# Patient Record
Sex: FEMALE | Race: WHITE | NOT HISPANIC OR LATINO | Employment: OTHER | URBAN - METROPOLITAN AREA
[De-identification: names, ages, dates, MRNs, and addresses within clinical notes are randomized per-mention and may not be internally consistent; named-entity substitution may affect disease eponyms.]

---

## 2020-01-01 ENCOUNTER — APPOINTMENT (OUTPATIENT)
Dept: RADIOLOGY | Facility: MEDICAL CENTER | Age: 85
DRG: 177 | End: 2020-01-01
Attending: EMERGENCY MEDICINE
Payer: MEDICARE

## 2020-01-01 ENCOUNTER — HOSPITAL ENCOUNTER (INPATIENT)
Facility: MEDICAL CENTER | Age: 85
LOS: 7 days | DRG: 177 | End: 2021-01-01
Attending: EMERGENCY MEDICINE | Admitting: INTERNAL MEDICINE
Payer: MEDICARE

## 2020-01-01 ENCOUNTER — APPOINTMENT (OUTPATIENT)
Dept: RADIOLOGY | Facility: MEDICAL CENTER | Age: 85
DRG: 177 | End: 2020-01-01
Attending: FAMILY MEDICINE
Payer: MEDICARE

## 2020-01-01 ENCOUNTER — APPOINTMENT (OUTPATIENT)
Dept: CARDIOLOGY | Facility: MEDICAL CENTER | Age: 85
DRG: 177 | End: 2020-01-01
Attending: INTERNAL MEDICINE
Payer: MEDICARE

## 2020-01-01 DIAGNOSIS — F03.90 DEMENTIA WITHOUT BEHAVIORAL DISTURBANCE, UNSPECIFIED DEMENTIA TYPE: ICD-10-CM

## 2020-01-01 DIAGNOSIS — J12.82 PNEUMONIA DUE TO COVID-19 VIRUS: ICD-10-CM
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
## 2020-01-01 DIAGNOSIS — R09.02 HYPOXIA: ICD-10-CM

## 2020-01-01 DIAGNOSIS — U07.1 PNEUMONIA DUE TO COVID-19 VIRUS: ICD-10-CM

## 2020-01-01 LAB
ALBUMIN SERPL BCP-MCNC: 3.3 G/DL (ref 3.2–4.9)
ALBUMIN SERPL BCP-MCNC: 3.4 G/DL (ref 3.2–4.9)
ALBUMIN SERPL BCP-MCNC: 3.5 G/DL (ref 3.2–4.9)
ALBUMIN SERPL BCP-MCNC: 3.8 G/DL (ref 3.2–4.9)
ALBUMIN/GLOB SERPL: 1.1 G/DL
ALBUMIN/GLOB SERPL: 1.2 G/DL
ALBUMIN/GLOB SERPL: 1.4 G/DL
ALBUMIN/GLOB SERPL: 1.5 G/DL
ALP SERPL-CCNC: 66 U/L (ref 30–99)
ALP SERPL-CCNC: 71 U/L (ref 30–99)
ALT SERPL-CCNC: 28 U/L (ref 2–50)
ALT SERPL-CCNC: 49 U/L (ref 2–50)
ALT SERPL-CCNC: 62 U/L (ref 2–50)
ALT SERPL-CCNC: 65 U/L (ref 2–50)
ANION GAP SERPL CALC-SCNC: 10 MMOL/L (ref 7–16)
ANION GAP SERPL CALC-SCNC: 10 MMOL/L (ref 7–16)
ANION GAP SERPL CALC-SCNC: 11 MMOL/L (ref 7–16)
ANION GAP SERPL CALC-SCNC: 12 MMOL/L (ref 7–16)
ANION GAP SERPL CALC-SCNC: 13 MMOL/L (ref 7–16)
ANION GAP SERPL CALC-SCNC: 15 MMOL/L (ref 7–16)
ANION GAP SERPL CALC-SCNC: 16 MMOL/L (ref 7–16)
APPEARANCE UR: CLEAR
AST SERPL-CCNC: 116 U/L (ref 12–45)
AST SERPL-CCNC: 128 U/L (ref 12–45)
AST SERPL-CCNC: 60 U/L (ref 12–45)
AST SERPL-CCNC: 90 U/L (ref 12–45)
BACTERIA #/AREA URNS HPF: ABNORMAL /HPF
BACTERIA BLD CULT: NORMAL
BACTERIA BLD CULT: NORMAL
BASOPHILS # BLD AUTO: 0.1 % (ref 0–1.8)
BASOPHILS # BLD AUTO: 0.2 % (ref 0–1.8)
BASOPHILS # BLD AUTO: 0.2 % (ref 0–1.8)
BASOPHILS # BLD: 0.01 K/UL (ref 0–0.12)
BASOPHILS # BLD: 0.02 K/UL (ref 0–0.12)
BASOPHILS # BLD: 0.02 K/UL (ref 0–0.12)
BASOPHILS # BLD: 0.03 K/UL (ref 0–0.12)
BILIRUB SERPL-MCNC: 0.5 MG/DL (ref 0.1–1.5)
BILIRUB SERPL-MCNC: 0.8 MG/DL (ref 0.1–1.5)
BILIRUB SERPL-MCNC: 0.9 MG/DL (ref 0.1–1.5)
BILIRUB SERPL-MCNC: 1.1 MG/DL (ref 0.1–1.5)
BILIRUB UR QL STRIP.AUTO: ABNORMAL
BUN SERPL-MCNC: 16 MG/DL (ref 8–22)
BUN SERPL-MCNC: 20 MG/DL (ref 8–22)
BUN SERPL-MCNC: 21 MG/DL (ref 8–22)
BUN SERPL-MCNC: 24 MG/DL (ref 8–22)
BUN SERPL-MCNC: 27 MG/DL (ref 8–22)
BUN SERPL-MCNC: 32 MG/DL (ref 8–22)
BUN SERPL-MCNC: 63 MG/DL (ref 8–22)
CALCIUM SERPL-MCNC: 10 MG/DL (ref 8.4–10.2)
CALCIUM SERPL-MCNC: 10.2 MG/DL (ref 8.4–10.2)
CALCIUM SERPL-MCNC: 10.3 MG/DL (ref 8.4–10.2)
CALCIUM SERPL-MCNC: 10.3 MG/DL (ref 8.4–10.2)
CALCIUM SERPL-MCNC: 10.4 MG/DL (ref 8.4–10.2)
CALCIUM SERPL-MCNC: 10.4 MG/DL (ref 8.4–10.2)
CALCIUM SERPL-MCNC: 9.5 MG/DL (ref 8.4–10.2)
CHLORIDE SERPL-SCNC: 101 MMOL/L (ref 96–112)
CHLORIDE SERPL-SCNC: 102 MMOL/L (ref 96–112)
CHLORIDE SERPL-SCNC: 103 MMOL/L (ref 96–112)
CHLORIDE SERPL-SCNC: 104 MMOL/L (ref 96–112)
CHLORIDE SERPL-SCNC: 106 MMOL/L (ref 96–112)
CHLORIDE SERPL-SCNC: 106 MMOL/L (ref 96–112)
CHLORIDE SERPL-SCNC: 111 MMOL/L (ref 96–112)
CO2 SERPL-SCNC: 22 MMOL/L (ref 20–33)
CO2 SERPL-SCNC: 24 MMOL/L (ref 20–33)
CO2 SERPL-SCNC: 25 MMOL/L (ref 20–33)
CO2 SERPL-SCNC: 26 MMOL/L (ref 20–33)
CO2 SERPL-SCNC: 28 MMOL/L (ref 20–33)
COLOR UR: ABNORMAL
COVID ORDER STATUS COVID19: NORMAL
CREAT SERPL-MCNC: 0.57 MG/DL (ref 0.5–1.4)
CREAT SERPL-MCNC: 0.6 MG/DL (ref 0.5–1.4)
CREAT SERPL-MCNC: 0.62 MG/DL (ref 0.5–1.4)
CREAT SERPL-MCNC: 0.72 MG/DL (ref 0.5–1.4)
CREAT SERPL-MCNC: 0.82 MG/DL (ref 0.5–1.4)
CREAT SERPL-MCNC: 1.08 MG/DL (ref 0.5–1.4)
CREAT SERPL-MCNC: 1.17 MG/DL (ref 0.5–1.4)
CRP SERPL HS-MCNC: 7.41 MG/DL (ref 0–0.75)
CRP SERPL HS-MCNC: 7.87 MG/DL (ref 0–0.75)
D DIMER PPP IA.FEU-MCNC: 9.75 UG/ML (FEU) (ref 0–0.5)
EKG IMPRESSION: NORMAL
EOSINOPHIL # BLD AUTO: 0 K/UL (ref 0–0.51)
EOSINOPHIL # BLD AUTO: 0.01 K/UL (ref 0–0.51)
EOSINOPHIL # BLD AUTO: 0.01 K/UL (ref 0–0.51)
EOSINOPHIL NFR BLD: 0 % (ref 0–6.9)
EOSINOPHIL NFR BLD: 0.1 % (ref 0–6.9)
EOSINOPHIL NFR BLD: 0.1 % (ref 0–6.9)
ERYTHROCYTE [DISTWIDTH] IN BLOOD BY AUTOMATED COUNT: 45.9 FL (ref 35.9–50)
ERYTHROCYTE [DISTWIDTH] IN BLOOD BY AUTOMATED COUNT: 46.7 FL (ref 35.9–50)
ERYTHROCYTE [DISTWIDTH] IN BLOOD BY AUTOMATED COUNT: 47.3 FL (ref 35.9–50)
ERYTHROCYTE [DISTWIDTH] IN BLOOD BY AUTOMATED COUNT: 47.7 FL (ref 35.9–50)
ERYTHROCYTE [DISTWIDTH] IN BLOOD BY AUTOMATED COUNT: 48 FL (ref 35.9–50)
ERYTHROCYTE [DISTWIDTH] IN BLOOD BY AUTOMATED COUNT: 50 FL (ref 35.9–50)
FLUAV RNA SPEC QL NAA+PROBE: NEGATIVE
FLUBV RNA SPEC QL NAA+PROBE: NEGATIVE
GLOBULIN SER CALC-MCNC: 2.5 G/DL (ref 1.9–3.5)
GLOBULIN SER CALC-MCNC: 2.6 G/DL (ref 1.9–3.5)
GLOBULIN SER CALC-MCNC: 2.8 G/DL (ref 1.9–3.5)
GLOBULIN SER CALC-MCNC: 3 G/DL (ref 1.9–3.5)
GLUCOSE SERPL-MCNC: 108 MG/DL (ref 65–99)
GLUCOSE SERPL-MCNC: 123 MG/DL (ref 65–99)
GLUCOSE SERPL-MCNC: 132 MG/DL (ref 65–99)
GLUCOSE SERPL-MCNC: 135 MG/DL (ref 65–99)
GLUCOSE SERPL-MCNC: 90 MG/DL (ref 65–99)
GLUCOSE SERPL-MCNC: 92 MG/DL (ref 65–99)
GLUCOSE SERPL-MCNC: 99 MG/DL (ref 65–99)
GLUCOSE UR STRIP.AUTO-MCNC: NEGATIVE MG/DL
HCT VFR BLD AUTO: 41.6 % (ref 37–47)
HCT VFR BLD AUTO: 42.1 % (ref 37–47)
HCT VFR BLD AUTO: 43 % (ref 37–47)
HCT VFR BLD AUTO: 45.5 % (ref 37–47)
HCT VFR BLD AUTO: 47.9 % (ref 37–47)
HCT VFR BLD AUTO: 52 % (ref 37–47)
HGB BLD-MCNC: 13.5 G/DL (ref 12–16)
HGB BLD-MCNC: 14 G/DL (ref 12–16)
HGB BLD-MCNC: 14.1 G/DL (ref 12–16)
HGB BLD-MCNC: 14.6 G/DL (ref 12–16)
HGB BLD-MCNC: 15.6 G/DL (ref 12–16)
HGB BLD-MCNC: 16.3 G/DL (ref 12–16)
HYALINE CASTS #/AREA URNS LPF: >10 /LPF
IMM GRANULOCYTES # BLD AUTO: 0.04 K/UL (ref 0–0.11)
IMM GRANULOCYTES # BLD AUTO: 0.07 K/UL (ref 0–0.11)
IMM GRANULOCYTES # BLD AUTO: 0.07 K/UL (ref 0–0.11)
IMM GRANULOCYTES # BLD AUTO: 0.09 K/UL (ref 0–0.11)
IMM GRANULOCYTES # BLD AUTO: 0.1 K/UL (ref 0–0.11)
IMM GRANULOCYTES # BLD AUTO: 0.11 K/UL (ref 0–0.11)
IMM GRANULOCYTES NFR BLD AUTO: 0.4 % (ref 0–0.9)
IMM GRANULOCYTES NFR BLD AUTO: 0.5 % (ref 0–0.9)
IMM GRANULOCYTES NFR BLD AUTO: 0.6 % (ref 0–0.9)
IMM GRANULOCYTES NFR BLD AUTO: 0.6 % (ref 0–0.9)
IMM GRANULOCYTES NFR BLD AUTO: 0.7 % (ref 0–0.9)
IMM GRANULOCYTES NFR BLD AUTO: 0.9 % (ref 0–0.9)
KETONES UR STRIP.AUTO-MCNC: 15 MG/DL
LEUKOCYTE ESTERASE UR QL STRIP.AUTO: NEGATIVE
LYMPHOCYTES # BLD AUTO: 0.56 K/UL (ref 1–4.8)
LYMPHOCYTES # BLD AUTO: 0.62 K/UL (ref 1–4.8)
LYMPHOCYTES # BLD AUTO: 0.84 K/UL (ref 1–4.8)
LYMPHOCYTES # BLD AUTO: 1.06 K/UL (ref 1–4.8)
LYMPHOCYTES # BLD AUTO: 1.21 K/UL (ref 1–4.8)
LYMPHOCYTES # BLD AUTO: 1.33 K/UL (ref 1–4.8)
LYMPHOCYTES NFR BLD: 11.3 % (ref 22–41)
LYMPHOCYTES NFR BLD: 4.2 % (ref 22–41)
LYMPHOCYTES NFR BLD: 4.6 % (ref 22–41)
LYMPHOCYTES NFR BLD: 6.8 % (ref 22–41)
LYMPHOCYTES NFR BLD: 8.2 % (ref 22–41)
LYMPHOCYTES NFR BLD: 9 % (ref 22–41)
MAGNESIUM SERPL-MCNC: 2.2 MG/DL (ref 1.5–2.5)
MAGNESIUM SERPL-MCNC: 2.4 MG/DL (ref 1.5–2.5)
MAGNESIUM SERPL-MCNC: 3.2 MG/DL (ref 1.5–2.5)
MCH RBC QN AUTO: 29.3 PG (ref 27–33)
MCH RBC QN AUTO: 29.3 PG (ref 27–33)
MCH RBC QN AUTO: 29.5 PG (ref 27–33)
MCH RBC QN AUTO: 29.6 PG (ref 27–33)
MCH RBC QN AUTO: 29.7 PG (ref 27–33)
MCH RBC QN AUTO: 29.9 PG (ref 27–33)
MCHC RBC AUTO-ENTMCNC: 31.3 G/DL (ref 33.6–35)
MCHC RBC AUTO-ENTMCNC: 32.1 G/DL (ref 33.6–35)
MCHC RBC AUTO-ENTMCNC: 32.5 G/DL (ref 33.6–35)
MCHC RBC AUTO-ENTMCNC: 32.6 G/DL (ref 33.6–35)
MCHC RBC AUTO-ENTMCNC: 32.8 G/DL (ref 33.6–35)
MCHC RBC AUTO-ENTMCNC: 33.3 G/DL (ref 33.6–35)
MCV RBC AUTO: 89.8 FL (ref 81.4–97.8)
MCV RBC AUTO: 90.3 FL (ref 81.4–97.8)
MCV RBC AUTO: 91 FL (ref 81.4–97.8)
MCV RBC AUTO: 91.1 FL (ref 81.4–97.8)
MCV RBC AUTO: 91.4 FL (ref 81.4–97.8)
MCV RBC AUTO: 93.5 FL (ref 81.4–97.8)
MICRO URNS: ABNORMAL
MONOCYTES # BLD AUTO: 1.15 K/UL (ref 0–0.85)
MONOCYTES # BLD AUTO: 1.22 K/UL (ref 0–0.85)
MONOCYTES # BLD AUTO: 1.23 K/UL (ref 0–0.85)
MONOCYTES # BLD AUTO: 1.34 K/UL (ref 0–0.85)
MONOCYTES # BLD AUTO: 1.39 K/UL (ref 0–0.85)
MONOCYTES # BLD AUTO: 1.79 K/UL (ref 0–0.85)
MONOCYTES NFR BLD AUTO: 10.3 % (ref 0–13.4)
MONOCYTES NFR BLD AUTO: 11.1 % (ref 0–13.4)
MONOCYTES NFR BLD AUTO: 12.1 % (ref 0–13.4)
MONOCYTES NFR BLD AUTO: 12.3 % (ref 0–13.4)
MONOCYTES NFR BLD AUTO: 7.6 % (ref 0–13.4)
MONOCYTES NFR BLD AUTO: 9.9 % (ref 0–13.4)
MUCOUS THREADS #/AREA URNS HPF: ABNORMAL /HPF
NEUTROPHILS # BLD AUTO: 10.07 K/UL (ref 2–7.15)
NEUTROPHILS # BLD AUTO: 10.15 K/UL (ref 2–7.15)
NEUTROPHILS # BLD AUTO: 10.76 K/UL (ref 2–7.15)
NEUTROPHILS # BLD AUTO: 12.21 K/UL (ref 2–7.15)
NEUTROPHILS # BLD AUTO: 13.62 K/UL (ref 2–7.15)
NEUTROPHILS # BLD AUTO: 7.12 K/UL (ref 2–7.15)
NEUTROPHILS NFR BLD: 75.9 % (ref 44–72)
NEUTROPHILS NFR BLD: 80 % (ref 44–72)
NEUTROPHILS NFR BLD: 82.2 % (ref 44–72)
NEUTROPHILS NFR BLD: 82.8 % (ref 44–72)
NEUTROPHILS NFR BLD: 83.5 % (ref 44–72)
NEUTROPHILS NFR BLD: 83.5 % (ref 44–72)
NITRITE UR QL STRIP.AUTO: NEGATIVE
NRBC # BLD AUTO: 0 K/UL
NRBC BLD-RTO: 0 /100 WBC
NT-PROBNP SERPL IA-MCNC: 3137 PG/ML (ref 0–125)
PH UR STRIP.AUTO: 5.5 [PH] (ref 5–8)
PLATELET # BLD AUTO: 284 K/UL (ref 164–446)
PLATELET # BLD AUTO: 284 K/UL (ref 164–446)
PLATELET # BLD AUTO: 325 K/UL (ref 164–446)
PLATELET # BLD AUTO: 353 K/UL (ref 164–446)
PLATELET # BLD AUTO: 354 K/UL (ref 164–446)
PLATELET # BLD AUTO: 362 K/UL (ref 164–446)
PMV BLD AUTO: 10 FL (ref 9–12.9)
PMV BLD AUTO: 10.5 FL (ref 9–12.9)
PMV BLD AUTO: 10.7 FL (ref 9–12.9)
PMV BLD AUTO: 11.1 FL (ref 9–12.9)
PMV BLD AUTO: 11.1 FL (ref 9–12.9)
PMV BLD AUTO: 11.7 FL (ref 9–12.9)
POTASSIUM SERPL-SCNC: 2.9 MMOL/L (ref 3.6–5.5)
POTASSIUM SERPL-SCNC: 3 MMOL/L (ref 3.6–5.5)
POTASSIUM SERPL-SCNC: 3.1 MMOL/L (ref 3.6–5.5)
POTASSIUM SERPL-SCNC: 3.2 MMOL/L (ref 3.6–5.5)
POTASSIUM SERPL-SCNC: 3.5 MMOL/L (ref 3.6–5.5)
POTASSIUM SERPL-SCNC: 3.6 MMOL/L (ref 3.6–5.5)
POTASSIUM SERPL-SCNC: 5 MMOL/L (ref 3.6–5.5)
PROCALCITONIN SERPL-MCNC: 0.24 NG/ML
PROCALCITONIN SERPL-MCNC: 0.25 NG/ML
PROT SERPL-MCNC: 6 G/DL (ref 6–8.2)
PROT SERPL-MCNC: 6.2 G/DL (ref 6–8.2)
PROT SERPL-MCNC: 6.3 G/DL (ref 6–8.2)
PROT SERPL-MCNC: 6.4 G/DL (ref 6–8.2)
PROT UR QL STRIP: NEGATIVE MG/DL
RBC # BLD AUTO: 4.57 M/UL (ref 4.2–5.4)
RBC # BLD AUTO: 4.69 M/UL (ref 4.2–5.4)
RBC # BLD AUTO: 4.76 M/UL (ref 4.2–5.4)
RBC # BLD AUTO: 4.98 M/UL (ref 4.2–5.4)
RBC # BLD AUTO: 5.26 M/UL (ref 4.2–5.4)
RBC # BLD AUTO: 5.56 M/UL (ref 4.2–5.4)
RBC # URNS HPF: ABNORMAL /HPF
RBC UR QL AUTO: ABNORMAL
RSV RNA SPEC QL NAA+PROBE: NEGATIVE
SARS-COV-2 RNA RESP QL NAA+PROBE: DETECTED
SIGNIFICANT IND 70042: NORMAL
SIGNIFICANT IND 70042: NORMAL
SITE SITE: NORMAL
SITE SITE: NORMAL
SODIUM SERPL-SCNC: 138 MMOL/L (ref 135–145)
SODIUM SERPL-SCNC: 138 MMOL/L (ref 135–145)
SODIUM SERPL-SCNC: 141 MMOL/L (ref 135–145)
SODIUM SERPL-SCNC: 142 MMOL/L (ref 135–145)
SODIUM SERPL-SCNC: 144 MMOL/L (ref 135–145)
SODIUM SERPL-SCNC: 144 MMOL/L (ref 135–145)
SODIUM SERPL-SCNC: 148 MMOL/L (ref 135–145)
SOURCE SOURCE: NORMAL
SOURCE SOURCE: NORMAL
SP GR UR REFRACTOMETRY: 1.02
SPECIMEN SOURCE: ABNORMAL
TROPONIN T SERPL-MCNC: 109 NG/L (ref 6–19)
TROPONIN T SERPL-MCNC: 121 NG/L (ref 6–19)
TROPONIN T SERPL-MCNC: 86 NG/L (ref 6–19)
TSH SERPL DL<=0.005 MIU/L-ACNC: 0.59 UIU/ML (ref 0.38–5.33)
UNIDENT CRYS URNS QL MICRO: ABNORMAL /HPF
WBC # BLD AUTO: 12.1 K/UL (ref 4.8–10.8)
WBC # BLD AUTO: 12.4 K/UL (ref 4.8–10.8)
WBC # BLD AUTO: 13.5 K/UL (ref 4.8–10.8)
WBC # BLD AUTO: 14.8 K/UL (ref 4.8–10.8)
WBC # BLD AUTO: 16.3 K/UL (ref 4.8–10.8)
WBC # BLD AUTO: 9.4 K/UL (ref 4.8–10.8)
WBC #/AREA URNS HPF: ABNORMAL /HPF

## 2020-01-01 PROCEDURE — 93010 ELECTROCARDIOGRAM REPORT: CPT | Performed by: INTERNAL MEDICINE

## 2020-01-01 PROCEDURE — 92610 EVALUATE SWALLOWING FUNCTION: CPT

## 2020-01-01 PROCEDURE — A9270 NON-COVERED ITEM OR SERVICE: HCPCS | Performed by: INTERNAL MEDICINE

## 2020-01-01 PROCEDURE — 770021 HCHG ROOM/CARE - ISO PRIVATE

## 2020-01-01 PROCEDURE — 700102 HCHG RX REV CODE 250 W/ 637 OVERRIDE(OP): Performed by: INTERNAL MEDICINE

## 2020-01-01 PROCEDURE — 700102 HCHG RX REV CODE 250 W/ 637 OVERRIDE(OP): Performed by: STUDENT IN AN ORGANIZED HEALTH CARE EDUCATION/TRAINING PROGRAM

## 2020-01-01 PROCEDURE — 84443 ASSAY THYROID STIM HORMONE: CPT

## 2020-01-01 PROCEDURE — 700111 HCHG RX REV CODE 636 W/ 250 OVERRIDE (IP): Performed by: INTERNAL MEDICINE

## 2020-01-01 PROCEDURE — A9270 NON-COVERED ITEM OR SERVICE: HCPCS | Performed by: STUDENT IN AN ORGANIZED HEALTH CARE EDUCATION/TRAINING PROGRAM

## 2020-01-01 PROCEDURE — C9803 HOPD COVID-19 SPEC COLLECT: HCPCS | Performed by: EMERGENCY MEDICINE

## 2020-01-01 PROCEDURE — 700102 HCHG RX REV CODE 250 W/ 637 OVERRIDE(OP): Performed by: FAMILY MEDICINE

## 2020-01-01 PROCEDURE — 81001 URINALYSIS AUTO W/SCOPE: CPT

## 2020-01-01 PROCEDURE — 87040 BLOOD CULTURE FOR BACTERIA: CPT

## 2020-01-01 PROCEDURE — 770020 HCHG ROOM/CARE - TELE (206)

## 2020-01-01 PROCEDURE — 93005 ELECTROCARDIOGRAM TRACING: CPT | Performed by: STUDENT IN AN ORGANIZED HEALTH CARE EDUCATION/TRAINING PROGRAM

## 2020-01-01 PROCEDURE — 83735 ASSAY OF MAGNESIUM: CPT

## 2020-01-01 PROCEDURE — 51701 INSERT BLADDER CATHETER: CPT

## 2020-01-01 PROCEDURE — 83880 ASSAY OF NATRIURETIC PEPTIDE: CPT

## 2020-01-01 PROCEDURE — A9270 NON-COVERED ITEM OR SERVICE: HCPCS | Performed by: FAMILY MEDICINE

## 2020-01-01 PROCEDURE — 71045 X-RAY EXAM CHEST 1 VIEW: CPT

## 2020-01-01 PROCEDURE — 99232 SBSQ HOSP IP/OBS MODERATE 35: CPT | Performed by: STUDENT IN AN ORGANIZED HEALTH CARE EDUCATION/TRAINING PROGRAM

## 2020-01-01 PROCEDURE — 80053 COMPREHEN METABOLIC PANEL: CPT

## 2020-01-01 PROCEDURE — 85025 COMPLETE CBC W/AUTO DIFF WBC: CPT

## 2020-01-01 PROCEDURE — 73501 X-RAY EXAM HIP UNI 1 VIEW: CPT | Mod: LT

## 2020-01-01 PROCEDURE — 70450 CT HEAD/BRAIN W/O DYE: CPT

## 2020-01-01 PROCEDURE — 73070 X-RAY EXAM OF ELBOW: CPT | Mod: LT

## 2020-01-01 PROCEDURE — 99232 SBSQ HOSP IP/OBS MODERATE 35: CPT | Performed by: FAMILY MEDICINE

## 2020-01-01 PROCEDURE — 92526 ORAL FUNCTION THERAPY: CPT

## 2020-01-01 PROCEDURE — 0241U HCHG SARS-COV-2 COVID-19 NFCT DS RESP RNA 4 TRGT MIC: CPT

## 2020-01-01 PROCEDURE — 700105 HCHG RX REV CODE 258: Performed by: INTERNAL MEDICINE

## 2020-01-01 PROCEDURE — 93306 TTE W/DOPPLER COMPLETE: CPT | Mod: 26 | Performed by: STUDENT IN AN ORGANIZED HEALTH CARE EDUCATION/TRAINING PROGRAM

## 2020-01-01 PROCEDURE — 93306 TTE W/DOPPLER COMPLETE: CPT

## 2020-01-01 PROCEDURE — 99233 SBSQ HOSP IP/OBS HIGH 50: CPT | Performed by: FAMILY MEDICINE

## 2020-01-01 PROCEDURE — 36415 COLL VENOUS BLD VENIPUNCTURE: CPT

## 2020-01-01 PROCEDURE — 86140 C-REACTIVE PROTEIN: CPT | Mod: 91

## 2020-01-01 PROCEDURE — 99223 1ST HOSP IP/OBS HIGH 75: CPT | Mod: AI | Performed by: INTERNAL MEDICINE

## 2020-01-01 PROCEDURE — 80048 BASIC METABOLIC PNL TOTAL CA: CPT

## 2020-01-01 PROCEDURE — 85379 FIBRIN DEGRADATION QUANT: CPT

## 2020-01-01 PROCEDURE — 700111 HCHG RX REV CODE 636 W/ 250 OVERRIDE (IP)

## 2020-01-01 PROCEDURE — 700105 HCHG RX REV CODE 258

## 2020-01-01 PROCEDURE — 84484 ASSAY OF TROPONIN QUANT: CPT | Mod: 91

## 2020-01-01 PROCEDURE — 93005 ELECTROCARDIOGRAM TRACING: CPT | Performed by: INTERNAL MEDICINE

## 2020-01-01 PROCEDURE — 84145 PROCALCITONIN (PCT): CPT | Mod: 91

## 2020-01-01 PROCEDURE — 93005 ELECTROCARDIOGRAM TRACING: CPT

## 2020-01-01 PROCEDURE — 99285 EMERGENCY DEPT VISIT HI MDM: CPT

## 2020-01-01 RX ORDER — ASPIRIN 600 MG/1
300 SUPPOSITORY RECTAL DAILY
Status: DISCONTINUED | OUTPATIENT
Start: 2020-01-01 | End: 2020-01-01

## 2020-01-01 RX ORDER — POTASSIUM CHLORIDE 20 MEQ/1
40 TABLET, EXTENDED RELEASE ORAL ONCE
Status: COMPLETED | OUTPATIENT
Start: 2020-01-01 | End: 2020-01-01

## 2020-01-01 RX ORDER — CLONIDINE HYDROCHLORIDE 0.1 MG/1
0.1 TABLET ORAL EVERY 6 HOURS PRN
Status: DISCONTINUED | OUTPATIENT
Start: 2020-01-01 | End: 2021-01-01

## 2020-01-01 RX ORDER — ASPIRIN 81 MG/1
81 TABLET, CHEWABLE ORAL DAILY
Status: DISCONTINUED | OUTPATIENT
Start: 2020-01-01 | End: 2021-01-01

## 2020-01-01 RX ORDER — LEVOTHYROXINE SODIUM 112 UG/1
112 TABLET ORAL 2 TIMES DAILY
Status: DISCONTINUED | OUTPATIENT
Start: 2020-01-01 | End: 2021-01-01

## 2020-01-01 RX ORDER — LORAZEPAM 0.5 MG/1
0.5 TABLET ORAL EVERY EVENING
COMMUNITY

## 2020-01-01 RX ORDER — ASPIRIN 81 MG/1
324 TABLET, CHEWABLE ORAL DAILY
Status: DISCONTINUED | OUTPATIENT
Start: 2020-01-01 | End: 2020-01-01

## 2020-01-01 RX ORDER — SODIUM CHLORIDE 9 MG/ML
INJECTION, SOLUTION INTRAVENOUS
Status: DISPENSED
Start: 2020-01-01 | End: 2020-01-01

## 2020-01-01 RX ORDER — FELODIPINE 5 MG/1
5 TABLET, EXTENDED RELEASE ORAL
Status: DISCONTINUED | OUTPATIENT
Start: 2020-01-01 | End: 2020-01-01

## 2020-01-01 RX ORDER — TRAMADOL HYDROCHLORIDE 50 MG/1
50 TABLET ORAL EVERY 6 HOURS PRN
COMMUNITY

## 2020-01-01 RX ORDER — HEPARIN SODIUM 5000 [USP'U]/ML
5000 INJECTION, SOLUTION INTRAVENOUS; SUBCUTANEOUS EVERY 8 HOURS
Status: DISCONTINUED | OUTPATIENT
Start: 2020-01-01 | End: 2021-01-01

## 2020-01-01 RX ORDER — TRAMADOL HYDROCHLORIDE 50 MG/1
50 TABLET ORAL EVERY 6 HOURS PRN
Status: DISCONTINUED | OUTPATIENT
Start: 2020-01-01 | End: 2021-01-01

## 2020-01-01 RX ORDER — AMOXICILLIN 250 MG
2 CAPSULE ORAL 2 TIMES DAILY
Status: DISCONTINUED | OUTPATIENT
Start: 2020-01-01 | End: 2021-01-01

## 2020-01-01 RX ORDER — GUAIFENESIN/DEXTROMETHORPHAN 100-10MG/5
10 SYRUP ORAL EVERY 6 HOURS PRN
Status: DISCONTINUED | OUTPATIENT
Start: 2020-01-01 | End: 2021-01-01

## 2020-01-01 RX ORDER — LORAZEPAM 0.5 MG/1
0.5 TABLET ORAL EVERY 4 HOURS PRN
Status: DISCONTINUED | OUTPATIENT
Start: 2020-01-01 | End: 2021-01-01 | Stop reason: HOSPADM

## 2020-01-01 RX ORDER — CEFTRIAXONE 1 G/1
INJECTION, POWDER, FOR SOLUTION INTRAMUSCULAR; INTRAVENOUS
Status: DISPENSED
Start: 2020-01-01 | End: 2020-01-01

## 2020-01-01 RX ORDER — POTASSIUM CHLORIDE 20 MEQ/1
40 TABLET, EXTENDED RELEASE ORAL 2 TIMES DAILY
Status: DISCONTINUED | OUTPATIENT
Start: 2020-01-01 | End: 2020-01-01

## 2020-01-01 RX ORDER — ALPRAZOLAM 0.5 MG/1
0.5 TABLET ORAL NIGHTLY PRN
Status: DISCONTINUED | OUTPATIENT
Start: 2020-01-01 | End: 2020-01-01

## 2020-01-01 RX ORDER — LORAZEPAM 0.5 MG/1
0.5 TABLET ORAL EVERY 6 HOURS PRN
Status: DISCONTINUED | OUTPATIENT
Start: 2020-01-01 | End: 2020-01-01

## 2020-01-01 RX ORDER — HALOPERIDOL 1 MG/1
1 TABLET ORAL EVERY 6 HOURS PRN
Status: DISCONTINUED | OUTPATIENT
Start: 2020-01-01 | End: 2020-01-01

## 2020-01-01 RX ORDER — POTASSIUM CHLORIDE 20 MEQ/1
20 TABLET, EXTENDED RELEASE ORAL ONCE
Status: COMPLETED | OUTPATIENT
Start: 2020-01-01 | End: 2020-01-01

## 2020-01-01 RX ORDER — ONDANSETRON 4 MG/1
4 TABLET, ORALLY DISINTEGRATING ORAL EVERY 4 HOURS PRN
Status: DISCONTINUED | OUTPATIENT
Start: 2020-01-01 | End: 2020-01-01

## 2020-01-01 RX ORDER — BISACODYL 10 MG
10 SUPPOSITORY, RECTAL RECTAL
Status: DISCONTINUED | OUTPATIENT
Start: 2020-01-01 | End: 2021-01-01

## 2020-01-01 RX ORDER — POLYETHYLENE GLYCOL 3350 17 G/17G
1 POWDER, FOR SOLUTION ORAL
Status: DISCONTINUED | OUTPATIENT
Start: 2020-01-01 | End: 2021-01-01

## 2020-01-01 RX ORDER — AZITHROMYCIN 250 MG/1
500 TABLET, FILM COATED ORAL DAILY
Status: DISCONTINUED | OUTPATIENT
Start: 2020-01-01 | End: 2020-01-01

## 2020-01-01 RX ORDER — ONDANSETRON 2 MG/ML
4 INJECTION INTRAMUSCULAR; INTRAVENOUS EVERY 4 HOURS PRN
Status: DISCONTINUED | OUTPATIENT
Start: 2020-01-01 | End: 2020-01-01

## 2020-01-01 RX ORDER — ASPIRIN 325 MG
325 TABLET ORAL DAILY
Status: DISCONTINUED | OUTPATIENT
Start: 2020-01-01 | End: 2020-01-01

## 2020-01-01 RX ORDER — DEXAMETHASONE SODIUM PHOSPHATE 4 MG/ML
6 INJECTION, SOLUTION INTRA-ARTICULAR; INTRALESIONAL; INTRAMUSCULAR; INTRAVENOUS; SOFT TISSUE DAILY
Status: DISCONTINUED | OUTPATIENT
Start: 2020-01-01 | End: 2020-01-01

## 2020-01-01 RX ORDER — POTASSIUM CHLORIDE 7.45 MG/ML
10 INJECTION INTRAVENOUS
Status: ACTIVE | OUTPATIENT
Start: 2020-01-01 | End: 2020-01-01

## 2020-01-01 RX ORDER — DEXAMETHASONE 4 MG/1
6 TABLET ORAL DAILY
Status: DISCONTINUED | OUTPATIENT
Start: 2020-01-01 | End: 2021-01-01

## 2020-01-01 RX ORDER — LORAZEPAM 0.5 MG/1
0.5 TABLET ORAL EVERY 4 HOURS PRN
Status: DISCONTINUED | OUTPATIENT
Start: 2020-01-01 | End: 2020-01-01

## 2020-01-01 RX ORDER — ACETAMINOPHEN 325 MG/1
650 TABLET ORAL EVERY 6 HOURS PRN
Status: DISCONTINUED | OUTPATIENT
Start: 2020-01-01 | End: 2021-01-01

## 2020-01-01 RX ADMIN — METOPROLOL TARTRATE 12.5 MG: 25 TABLET, FILM COATED ORAL at 17:49

## 2020-01-01 RX ADMIN — LEVOTHYROXINE SODIUM 112 MCG: 0.11 TABLET ORAL at 06:49

## 2020-01-01 RX ADMIN — ASPIRIN 81 MG CHEWABLE TABLET 81 MG: 81 TABLET CHEWABLE at 05:43

## 2020-01-01 RX ADMIN — HEPARIN SODIUM 5000 UNITS: 5000 INJECTION, SOLUTION INTRAVENOUS; SUBCUTANEOUS at 15:19

## 2020-01-01 RX ADMIN — CEFTRIAXONE SODIUM 1 G: 1 INJECTION, POWDER, FOR SOLUTION INTRAMUSCULAR; INTRAVENOUS at 05:13

## 2020-01-01 RX ADMIN — STANDARDIZED SENNA CONCENTRATE AND DOCUSATE SODIUM 2 TABLET: 8.6; 5 TABLET, FILM COATED ORAL at 00:23

## 2020-01-01 RX ADMIN — LEVOTHYROXINE SODIUM 112 MCG: 0.11 TABLET ORAL at 00:22

## 2020-01-01 RX ADMIN — LEVOTHYROXINE SODIUM 112 MCG: 0.11 TABLET ORAL at 16:51

## 2020-01-01 RX ADMIN — ASPIRIN 325 MG ORAL TABLET 325 MG: 325 PILL ORAL at 06:28

## 2020-01-01 RX ADMIN — METOPROLOL TARTRATE 12.5 MG: 25 TABLET, FILM COATED ORAL at 18:07

## 2020-01-01 RX ADMIN — POTASSIUM CHLORIDE 40 MEQ: 20 TABLET, EXTENDED RELEASE ORAL at 06:49

## 2020-01-01 RX ADMIN — HEPARIN SODIUM 5000 UNITS: 5000 INJECTION, SOLUTION INTRAVENOUS; SUBCUTANEOUS at 21:22

## 2020-01-01 RX ADMIN — POTASSIUM CHLORIDE 20 MEQ: 1500 TABLET, EXTENDED RELEASE ORAL at 14:10

## 2020-01-01 RX ADMIN — HEPARIN SODIUM 5000 UNITS: 5000 INJECTION, SOLUTION INTRAVENOUS; SUBCUTANEOUS at 13:34

## 2020-01-01 RX ADMIN — HEPARIN SODIUM 5000 UNITS: 5000 INJECTION, SOLUTION INTRAVENOUS; SUBCUTANEOUS at 22:35

## 2020-01-01 RX ADMIN — METOPROLOL TARTRATE 12.5 MG: 25 TABLET, FILM COATED ORAL at 07:30

## 2020-01-01 RX ADMIN — ACETAMINOPHEN 650 MG: 325 TABLET, FILM COATED ORAL at 05:15

## 2020-01-01 RX ADMIN — LORAZEPAM 0.5 MG: 0.5 TABLET ORAL at 09:53

## 2020-01-01 RX ADMIN — LEVOTHYROXINE SODIUM 112 MCG: 0.11 TABLET ORAL at 18:05

## 2020-01-01 RX ADMIN — TRAMADOL HYDROCHLORIDE 50 MG: 50 TABLET, FILM COATED ORAL at 06:10

## 2020-01-01 RX ADMIN — POTASSIUM CHLORIDE 40 MEQ: 20 TABLET, EXTENDED RELEASE ORAL at 18:04

## 2020-01-01 RX ADMIN — DEXAMETHASONE 6 MG: 4 TABLET ORAL at 07:30

## 2020-01-01 RX ADMIN — POTASSIUM CHLORIDE 40 MEQ: 1500 TABLET, EXTENDED RELEASE ORAL at 07:54

## 2020-01-01 RX ADMIN — HEPARIN SODIUM 5000 UNITS: 5000 INJECTION, SOLUTION INTRAVENOUS; SUBCUTANEOUS at 00:23

## 2020-01-01 RX ADMIN — STANDARDIZED SENNA CONCENTRATE AND DOCUSATE SODIUM 2 TABLET: 8.6; 5 TABLET, FILM COATED ORAL at 17:49

## 2020-01-01 RX ADMIN — HALOPERIDOL 1 MG: 1 TABLET ORAL at 12:12

## 2020-01-01 RX ADMIN — LEVOTHYROXINE SODIUM 112 MCG: 0.11 TABLET ORAL at 06:28

## 2020-01-01 RX ADMIN — STANDARDIZED SENNA CONCENTRATE AND DOCUSATE SODIUM 2 TABLET: 8.6; 5 TABLET, FILM COATED ORAL at 07:30

## 2020-01-01 RX ADMIN — STANDARDIZED SENNA CONCENTRATE AND DOCUSATE SODIUM 2 TABLET: 8.6; 5 TABLET, FILM COATED ORAL at 05:43

## 2020-01-01 RX ADMIN — LORAZEPAM 0.5 MG: 0.5 TABLET ORAL at 18:42

## 2020-01-01 RX ADMIN — LORAZEPAM 0.5 MG: 0.5 TABLET ORAL at 14:10

## 2020-01-01 RX ADMIN — TRAMADOL HYDROCHLORIDE 50 MG: 50 TABLET, FILM COATED ORAL at 22:35

## 2020-01-01 RX ADMIN — STANDARDIZED SENNA CONCENTRATE AND DOCUSATE SODIUM 2 TABLET: 8.6; 5 TABLET, FILM COATED ORAL at 05:15

## 2020-01-01 RX ADMIN — METOPROLOL TARTRATE 12.5 MG: 25 TABLET, FILM COATED ORAL at 18:42

## 2020-01-01 RX ADMIN — FELODIPINE 5 MG: 5 TABLET, EXTENDED RELEASE ORAL at 10:13

## 2020-01-01 RX ADMIN — HEPARIN SODIUM 5000 UNITS: 5000 INJECTION, SOLUTION INTRAVENOUS; SUBCUTANEOUS at 15:14

## 2020-01-01 RX ADMIN — LORAZEPAM 0.5 MG: 0.5 TABLET ORAL at 06:11

## 2020-01-01 RX ADMIN — ACETAMINOPHEN 650 MG: 325 TABLET, FILM COATED ORAL at 00:20

## 2020-01-01 RX ADMIN — HEPARIN SODIUM 5000 UNITS: 5000 INJECTION, SOLUTION INTRAVENOUS; SUBCUTANEOUS at 06:28

## 2020-01-01 RX ADMIN — LEVOTHYROXINE SODIUM 112 MCG: 0.11 TABLET ORAL at 17:49

## 2020-01-01 RX ADMIN — ASPIRIN 81 MG CHEWABLE TABLET 324 MG: 81 TABLET CHEWABLE at 05:14

## 2020-01-01 RX ADMIN — POTASSIUM CHLORIDE 40 MEQ: 20 TABLET, EXTENDED RELEASE ORAL at 07:21

## 2020-01-01 RX ADMIN — HEPARIN SODIUM 5000 UNITS: 5000 INJECTION, SOLUTION INTRAVENOUS; SUBCUTANEOUS at 07:21

## 2020-01-01 RX ADMIN — METOPROLOL TARTRATE 12.5 MG: 25 TABLET, FILM COATED ORAL at 07:21

## 2020-01-01 RX ADMIN — HEPARIN SODIUM 5000 UNITS: 5000 INJECTION, SOLUTION INTRAVENOUS; SUBCUTANEOUS at 06:48

## 2020-01-01 RX ADMIN — LEVOTHYROXINE SODIUM 112 MCG: 0.11 TABLET ORAL at 05:43

## 2020-01-01 RX ADMIN — HEPARIN SODIUM 5000 UNITS: 5000 INJECTION, SOLUTION INTRAVENOUS; SUBCUTANEOUS at 06:00

## 2020-01-01 RX ADMIN — TRAMADOL HYDROCHLORIDE 50 MG: 50 TABLET, FILM COATED ORAL at 13:12

## 2020-01-01 RX ADMIN — LEVOTHYROXINE SODIUM 112 MCG: 0.11 TABLET ORAL at 05:15

## 2020-01-01 RX ADMIN — DEXAMETHASONE SODIUM PHOSPHATE 6 MG: 4 INJECTION, SOLUTION INTRA-ARTICULAR; INTRALESIONAL; INTRAMUSCULAR; INTRAVENOUS; SOFT TISSUE at 05:43

## 2020-01-01 RX ADMIN — HALOPERIDOL 1 MG: 1 TABLET ORAL at 19:39

## 2020-01-01 RX ADMIN — STANDARDIZED SENNA CONCENTRATE AND DOCUSATE SODIUM 2 TABLET: 8.6; 5 TABLET, FILM COATED ORAL at 18:42

## 2020-01-01 RX ADMIN — ACETAMINOPHEN 650 MG: 325 TABLET, FILM COATED ORAL at 06:28

## 2020-01-01 RX ADMIN — LEVOTHYROXINE SODIUM 112 MCG: 0.11 TABLET ORAL at 18:25

## 2020-01-01 RX ADMIN — POTASSIUM CHLORIDE 40 MEQ: 1500 TABLET, EXTENDED RELEASE ORAL at 00:21

## 2020-01-01 RX ADMIN — ASPIRIN 81 MG CHEWABLE TABLET 81 MG: 81 TABLET CHEWABLE at 07:21

## 2020-01-01 RX ADMIN — STANDARDIZED SENNA CONCENTRATE AND DOCUSATE SODIUM 2 TABLET: 8.6; 5 TABLET, FILM COATED ORAL at 16:51

## 2020-01-01 RX ADMIN — HEPARIN SODIUM 5000 UNITS: 5000 INJECTION, SOLUTION INTRAVENOUS; SUBCUTANEOUS at 15:07

## 2020-01-01 RX ADMIN — POTASSIUM CHLORIDE 40 MEQ: 20 TABLET, EXTENDED RELEASE ORAL at 18:41

## 2020-01-01 RX ADMIN — DEXAMETHASONE 6 MG: 4 TABLET ORAL at 07:21

## 2020-01-01 RX ADMIN — DEXAMETHASONE SODIUM PHOSPHATE 6 MG: 4 INJECTION, SOLUTION INTRA-ARTICULAR; INTRALESIONAL; INTRAMUSCULAR; INTRAVENOUS; SOFT TISSUE at 05:14

## 2020-01-01 RX ADMIN — FELODIPINE 5 MG: 5 TABLET, EXTENDED RELEASE ORAL at 22:01

## 2020-01-01 RX ADMIN — HEPARIN SODIUM 5000 UNITS: 5000 INJECTION, SOLUTION INTRAVENOUS; SUBCUTANEOUS at 20:48

## 2020-01-01 RX ADMIN — LORAZEPAM 0.5 MG: 0.5 TABLET ORAL at 01:08

## 2020-01-01 RX ADMIN — TRAMADOL HYDROCHLORIDE 50 MG: 50 TABLET, FILM COATED ORAL at 15:36

## 2020-01-01 RX ADMIN — HEPARIN SODIUM 5000 UNITS: 5000 INJECTION, SOLUTION INTRAVENOUS; SUBCUTANEOUS at 13:21

## 2020-01-01 RX ADMIN — DEXAMETHASONE 6 MG: 4 TABLET ORAL at 06:49

## 2020-01-01 RX ADMIN — DEXAMETHASONE SODIUM PHOSPHATE 6 MG: 4 INJECTION, SOLUTION INTRA-ARTICULAR; INTRALESIONAL; INTRAMUSCULAR; INTRAVENOUS; SOFT TISSUE at 06:28

## 2020-01-01 RX ADMIN — HEPARIN SODIUM 5000 UNITS: 5000 INJECTION, SOLUTION INTRAVENOUS; SUBCUTANEOUS at 05:13

## 2020-01-01 RX ADMIN — FELODIPINE 5 MG: 5 TABLET, EXTENDED RELEASE ORAL at 20:48

## 2020-01-01 RX ADMIN — AZITHROMYCIN MONOHYDRATE 500 MG: 250 TABLET ORAL at 00:22

## 2020-01-01 RX ADMIN — ASPIRIN 81 MG CHEWABLE TABLET 81 MG: 81 TABLET CHEWABLE at 06:49

## 2020-01-01 RX ADMIN — FELODIPINE 5 MG: 5 TABLET, EXTENDED RELEASE ORAL at 07:54

## 2020-01-01 RX ADMIN — POTASSIUM CHLORIDE 40 MEQ: 20 TABLET, EXTENDED RELEASE ORAL at 07:30

## 2020-01-01 RX ADMIN — FELODIPINE 5 MG: 5 TABLET, EXTENDED RELEASE ORAL at 00:23

## 2020-01-01 RX ADMIN — LEVOTHYROXINE SODIUM 112 MCG: 0.11 TABLET ORAL at 07:30

## 2020-01-01 RX ADMIN — CLONIDINE HYDROCHLORIDE 0.1 MG: 0.1 TABLET ORAL at 12:21

## 2020-01-01 RX ADMIN — LEVOTHYROXINE SODIUM 112 MCG: 0.11 TABLET ORAL at 17:27

## 2020-01-01 RX ADMIN — LORAZEPAM 0.5 MG: 0.5 TABLET ORAL at 15:08

## 2020-01-01 RX ADMIN — HEPARIN SODIUM 5000 UNITS: 5000 INJECTION, SOLUTION INTRAVENOUS; SUBCUTANEOUS at 21:29

## 2020-01-01 RX ADMIN — STANDARDIZED SENNA CONCENTRATE AND DOCUSATE SODIUM 2 TABLET: 8.6; 5 TABLET, FILM COATED ORAL at 06:49

## 2020-01-01 RX ADMIN — FELODIPINE 5 MG: 5 TABLET, EXTENDED RELEASE ORAL at 09:09

## 2020-01-01 RX ADMIN — STANDARDIZED SENNA CONCENTRATE AND DOCUSATE SODIUM 2 TABLET: 8.6; 5 TABLET, FILM COATED ORAL at 07:21

## 2020-01-01 RX ADMIN — LEVOTHYROXINE SODIUM 112 MCG: 0.11 TABLET ORAL at 07:21

## 2020-01-01 RX ADMIN — LORAZEPAM 0.5 MG: 0.5 TABLET ORAL at 02:17

## 2020-01-01 RX ADMIN — STANDARDIZED SENNA CONCENTRATE AND DOCUSATE SODIUM 2 TABLET: 8.6; 5 TABLET, FILM COATED ORAL at 17:58

## 2020-01-01 RX ADMIN — POTASSIUM CHLORIDE 40 MEQ: 20 TABLET, EXTENDED RELEASE ORAL at 17:50

## 2020-01-01 RX ADMIN — HEPARIN SODIUM 5000 UNITS: 5000 INJECTION, SOLUTION INTRAVENOUS; SUBCUTANEOUS at 21:16

## 2020-01-01 RX ADMIN — ASPIRIN 81 MG CHEWABLE TABLET 81 MG: 81 TABLET CHEWABLE at 07:30

## 2020-01-01 RX ADMIN — HEPARIN SODIUM 5000 UNITS: 5000 INJECTION, SOLUTION INTRAVENOUS; SUBCUTANEOUS at 22:01

## 2020-01-01 RX ADMIN — HEPARIN SODIUM 5000 UNITS: 5000 INJECTION, SOLUTION INTRAVENOUS; SUBCUTANEOUS at 05:43

## 2020-01-01 RX ADMIN — LORAZEPAM 0.5 MG: 0.5 TABLET ORAL at 00:20

## 2020-01-01 ASSESSMENT — COGNITIVE AND FUNCTIONAL STATUS - GENERAL
EATING MEALS: A LITTLE
MOVING TO AND FROM BED TO CHAIR: A LITTLE
SUGGESTED CMS G CODE MODIFIER DAILY ACTIVITY: CL
WALKING IN HOSPITAL ROOM: A LOT
DAILY ACTIVITIY SCORE: 9
MOBILITY SCORE: 14
HELP NEEDED FOR BATHING: TOTAL
MOVING FROM LYING ON BACK TO SITTING ON SIDE OF FLAT BED: A LITTLE
CLIMB 3 TO 5 STEPS WITH RAILING: TOTAL
PERSONAL GROOMING: TOTAL
SUGGESTED CMS G CODE MODIFIER MOBILITY: CL
DRESSING REGULAR UPPER BODY CLOTHING: TOTAL
TURNING FROM BACK TO SIDE WHILE IN FLAT BAD: A LITTLE
STANDING UP FROM CHAIR USING ARMS: A LOT
DRESSING REGULAR LOWER BODY CLOTHING: A LOT
TOILETING: TOTAL

## 2020-01-01 ASSESSMENT — PAIN SCALES - PAIN ASSESSMENT IN ADVANCED DEMENTIA (PAINAD)
BREATHING: NORMAL
TOTALSCORE: 3
TOTALSCORE: 4
NEGVOCALIZATION: OCCASIONAL MOAN/GROAN, LOW SPEECH, NEGATIVE/DISAPPROVING QUALITY
TOTALSCORE: 3
BODYLANGUAGE: TENSE, DISTRESSED PACING, FIDGETING
FACIALEXPRESSION: SAD, FRIGHTENED, FROWN
TOTALSCORE: 4
CONSOLABILITY: DISTRACTED OR REASSURED BY VOICE/TOUCH
BODYLANGUAGE: TENSE, DISTRESSED PACING, FIDGETING
BODYLANGUAGE: RELAXED
FACIALEXPRESSION: SAD, FRIGHTENED, FROWN
CONSOLABILITY: DISTRACTED OR REASSURED BY VOICE/TOUCH
TOTALSCORE: 0
CONSOLABILITY: NO NEED TO CONSOLE
CONSOLABILITY: DISTRACTED OR REASSURED BY VOICE/TOUCH
BREATHING: NORMAL
NEGVOCALIZATION: OCCASIONAL MOAN/GROAN, LOW SPEECH, NEGATIVE/DISAPPROVING QUALITY
FACIALEXPRESSION: SMILING OR INEXPRESSIVE
FACIALEXPRESSION: SAD, FRIGHTENED, FROWN
NEGVOCALIZATION: OCCASIONAL MOAN/GROAN, LOW SPEECH, NEGATIVE/DISAPPROVING QUALITY
FACIALEXPRESSION: SMILING OR INEXPRESSIVE
CONSOLABILITY: NO NEED TO CONSOLE
FACIALEXPRESSION: SAD, FRIGHTENED, FROWN
BODYLANGUAGE: RELAXED
BREATHING: NORMAL
CONSOLABILITY: DISTRACTED OR REASSURED BY VOICE/TOUCH
NEGVOCALIZATION: OCCASIONAL MOAN/GROAN, LOW SPEECH, NEGATIVE/DISAPPROVING QUALITY
BREATHING: NORMAL
TOTALSCORE: 0
BODYLANGUAGE: RELAXED
BREATHING: NORMAL
BREATHING: NORMAL
BODYLANGUAGE: RELAXED

## 2020-01-01 ASSESSMENT — FIBROSIS 4 INDEX
FIB4 SCORE: 2.86
FIB4 SCORE: 3.7
FIB4 SCORE: 2.86
FIB4 SCORE: 5.01

## 2020-01-01 ASSESSMENT — PAIN SCALES - WONG BAKER
WONGBAKER_NUMERICALRESPONSE: DOESN'T HURT AT ALL

## 2020-12-25 PROBLEM — R79.89 ELEVATED TROPONIN: Status: ACTIVE | Noted: 2020-01-01

## 2020-12-25 PROBLEM — U07.1 PNEUMONIA DUE TO COVID-19 VIRUS: Status: ACTIVE | Noted: 2020-01-01

## 2020-12-25 PROBLEM — J12.82 PNEUMONIA DUE TO COVID-19 VIRUS: Status: ACTIVE | Noted: 2020-01-01

## 2020-12-25 PROBLEM — F03.90 DEMENTIA (HCC): Status: ACTIVE | Noted: 2020-01-01

## 2020-12-25 NOTE — ED TRIAGE NOTES
"BIB EMS from [place] ([facility])  after a MGLF this am.  LOC unkown.  Pt is not on blood thinners.  Pt was originally c/o L elbow pain.  Small skin tear noted but pt denies any pain with movement at this time. Denies any other pain at this time.  Of note, RA sats on scene were 86%, up to 93-94% on 4L via NC. PTA: 97/55, HR 89.  Pt has been at facility for approx 1 month but staff state they have noticed a change in her mentation (more \"grumpy\", less cooperative).   "

## 2020-12-26 PROBLEM — R74.01 ELEVATED AST (SGOT): Status: ACTIVE | Noted: 2020-01-01

## 2020-12-26 PROBLEM — E87.6 HYPOKALEMIA: Status: ACTIVE | Noted: 2020-01-01

## 2020-12-26 PROBLEM — R79.89 ELEVATED BRAIN NATRIURETIC PEPTIDE (BNP) LEVEL: Status: ACTIVE | Noted: 2020-01-01

## 2020-12-26 NOTE — PROGRESS NOTES
Patient arrived to unit via gurney. Patient assisted to bed. Tele monitor applied( SR), vitals, weight taken. Patient AOx1 to self. Admit profile and med rec completed. Discussed POC with patient, including welcome folder, call light, communication board. Adressed concerns and questions with patient. Fall precautions in place, bed alarm on as patient fell prior to being admitted. Purewick in place and patient tolerating as she is incontinent. Will continue to monitor.

## 2020-12-26 NOTE — ASSESSMENT & PLAN NOTE
Patient continues to be hypoxic currently on 6 L.  Continue Decadron  Pulse oximetry, incentive spirometry, wean down oxygen as tolerated  Patient's overall condition appears to be worsening attempting to call family to determine goals of care.  Patient may benefit from palliative care or hospice

## 2020-12-26 NOTE — PROGRESS NOTES
Hospital Medicine Daily Progress Note    Date of Service  12/26/2020    Chief Complaint  89 y.o. female admitted 12/25/2020 with GLF and hypoxia     Hospital Course  89 y.o. female with history of dementia, hypothyroidism, GERD, hypertensive heart disease, who presented 12/25/2020 after she was brought from [facility] after ground-level fall, generalized weakness and worsening of confusion.  Therefore history is limited.  Apparently she was complaining of left elbow pain, but x-ray did not show any dislocation or fracture.  Patient noted to be hypoxic with saturation 80 to 86% on room air.  Patient denied chest pain, cough or shortness of breath.  Evaluation in ER revealed that patient is COVID-19 positive.  Chest x-ray showed bilateral pneumonitis.  CT head without contrast showed cerebral atrophy.  Blood work abnormalities include troponin 121, potassium 3.1.  EKG showed sinus rhythm, heart rate 81, ST depression in lateral leads and T wave flattening.  No EKG to compare.    Interval Problem Update  12/26 - Pt remains on 4L O2, continue Decadron - troponins are trending down, no chest pain, awaiting echo results.  D dimer is significantly elevated, PTE could account for elevated troponins.  Renal function is fine, will check CTA today. Procalcitonin is normal, no fever, no focal consolidation on CXR, will stop antibiotics. Attempted to contact her son, Dirk, the phone number listed is disconnected. Will see if her facility has advanced directives on file for her.    Consultants/Specialty  None    Code Status  Full Code    Disposition  Continued hospitalization     Review of Systems  Review of Systems   Unable to perform ROS: Dementia        Physical Exam  Temp:  [36.3 °C (97.3 °F)-36.6 °C (97.9 °F)] 36.6 °C (97.9 °F)  Pulse:  [72-96] 79  Resp:  [16-34] 18  BP: ()/(53-74) 143/73  SpO2:  [92 %-98 %] 97 %    Physical Exam  Vitals signs and nursing note reviewed.   Constitutional:       General:  She is not in acute distress.     Appearance: She is ill-appearing. She is not toxic-appearing.   HENT:      Head: Normocephalic and atraumatic.      Mouth/Throat:      Pharynx: Oropharynx is clear.   Cardiovascular:      Rate and Rhythm: Normal rate and regular rhythm.   Pulmonary:      Effort: No respiratory distress.      Breath sounds: No rhonchi.      Comments: Diminished throughout  Abdominal:      General: Abdomen is flat. Bowel sounds are normal.      Palpations: Abdomen is soft.   Musculoskeletal:         General: No swelling.   Skin:     General: Skin is warm and dry.   Neurological:      General: No focal deficit present.      Mental Status: She is disoriented.      GCS: GCS eye subscore is 3. GCS verbal subscore is 2. GCS motor subscore is 5.         Fluids  No intake or output data in the 24 hours ending 12/26/20 0719    Laboratory  Recent Labs     12/25/20  1535 12/26/20  0428   WBC 14.8* 13.5*   RBC 4.69 4.57   HEMOGLOBIN 14.0 13.5   HEMATOCRIT 42.1 41.6   MCV 89.8 91.0   MCH 29.9 29.5   MCHC 33.3* 32.5*   RDW 45.9 48.0   PLATELETCT 353 325   MPV 10.0 10.5     Recent Labs     12/25/20  1535 12/26/20  0428   SODIUM 138 141   POTASSIUM 3.1* 3.0*   CHLORIDE 102 103   CO2 25 28   GLUCOSE 123* 92   BUN 24* 21   CREATININE 1.08 0.72   CALCIUM 10.4* 10.0                   Imaging  DX-CHEST-PORTABLE (1 VIEW)   Final Result      Peripheral interstitial opacities could be due to chronic inflammatory change, pulmonary edema or Covid 19 pneumonia.      Left basilar atelectasis or scar.      DX-ELBOW-LIMITED 2- LEFT   Final Result      No gross evidence of fracture or dislocation.      CT-HEAD W/O   Final Result      1.  No evidence of acute intracranial process.      2.  Cerebral atrophy as well as periventricular chronic small vessel ischemic change.      EC-ECHOCARDIOGRAM COMPLETE W/O CONT    (Results Pending)   CT-CTA CHEST PULMONARY ARTERY W/ RECONS    (Results Pending)        Assessment/Plan  Pneumonia due  to COVID-19 virus  Assessment & Plan  Patient is hypoxic, 4 L nasal cannula  Continue Decadron  Self proning as tolerated  Pulse oximetry, incentive spirometry, wean down oxygen as tolerated      Elevated brain natriuretic peptide (BNP) level  Assessment & Plan  Pt not clinically overloaded, will await echo results     Elevated AST (SGOT)  Assessment & Plan  Likely secondary to COVID.    If continues to trend up, will check RUQ u/s    Hypokalemia   Assessment & Plan  Replace orally, recheck K+ and Mag in the am       Dementia (HCC)  Assessment & Plan  CT head without contrast showed no acute abnormalities  Limit sedatives  Fall precautions    Elevated troponin  Assessment & Plan  Patient denied chest pain on admit, will not respond for me, troponins are trending down.  D dimer is significantly elevated, will check CTA.   Echo pending  On ASA, will avoid statin for now given her age and elevated AST        VTE prophylaxis: Heparin

## 2020-12-26 NOTE — ASSESSMENT & PLAN NOTE
Start scheduled replacement, 40meq BID for now, may decrease dosing when K+ levels not significantly depressed

## 2020-12-26 NOTE — H&P
Hospital Medicine History & Physical Note    Date of Service  12/25/2020    Primary Care Physician  Sandro Zeng M.D.    Consultants  None    Code Status  Full Code    Chief Complaint  Chief Complaint   Patient presents with   • T-5000 GLF     mechanical, 0645 this am       History of Presenting Illness  89 y.o. female with history of dementia, hypothyroidism, GERD, hypertensive heart disease, who presented 12/25/2020 after she was brought from [facility] after ground-level fall, generalized weakness and worsening of confusion.  Therefore history is limited.  Apparently she was complaining of left elbow pain, but x-ray did not show any dislocation or fracture.  Patient noted to be hypoxic with saturation 80 to 86% on room air.  Patient denied chest pain, cough or shortness of breath.  Evaluation in ER revealed that patient is COVID-19 positive.  Chest x-ray showed bilateral pneumonitis.  CT head without contrast showed cerebral atrophy.  Blood work abnormalities include troponin 121, potassium 3.1.  EKG showed sinus rhythm, heart rate 81, ST depression in lateral leads and T wave flattening.  No EKG to compare.    Review of Systems  Review of Systems   Unable to perform ROS: Dementia       Past Medical History   has a past medical history of Arthritis, Bronchitis, Heart Burn, Indigestion, Pain, and Unspecified Disorder of Thyroid.    Surgical History   has a past surgical history that includes cholecystectomy and lumbar laminectomy diskectomy (11/3/2009).     Family History  family history is not on file.     Social History   reports that she has quit smoking. She does not have any smokeless tobacco history on file. She reports that she does not drink alcohol or use drugs.    Allergies  Allergies   Allergen Reactions   • Clindamycin Unspecified     Per MAR from [place]       Medications  Prior to Admission Medications   Prescriptions Last Dose Informant Patient Reported? Taking?   LORazepam  (ATIVAN) 0.5 MG Tab 12/24/2020 at 1800 MAR from Other Facility Yes Yes   Sig: Take 0.5 mg by mouth every evening.   ezetimibe (ZETIA) 10 MG TABS 12/25/2020 at 0800 MAR from Other Facility Yes No   Sig: Take 10 mg by mouth every day.   felodipine (PLENDIL) 5 MG TB24 12/25/2020 at 0800 MAR from Other Facility Yes No   Sig: Take 5 mg by mouth 2 times a day. Indications: High Blood Pressure Disorder   levothyroxine (SYNTHROID) 112 MCG Tab 12/24/2020 at 0800 MAR from Other Facility Yes No   Sig: Take 112 mcg by mouth 2 Times a Day. On Mondays, Tuesdays, Wednesdays,and Thursdays for Hypothyroidism   traMADol (ULTRAM) 50 MG Tab 12/23/2020 at 1911 MAR from Other Facility Yes Yes   Sig: Take 50 mg by mouth every 6 hours as needed.      Facility-Administered Medications: None       Physical Exam  Temp:  [36.5 °C (97.7 °F)] 36.5 °C (97.7 °F)  Pulse:  [79-96] 79  Resp:  [16-34] 20  BP: ()/(54-63) 104/58  SpO2:  [92 %-97 %] 94 %    Physical Exam  Vitals signs and nursing note reviewed.   Constitutional:       General: She is not in acute distress.     Appearance: Normal appearance.   HENT:      Head: Normocephalic and atraumatic.      Nose: Nose normal.      Mouth/Throat:      Mouth: Mucous membranes are moist.   Eyes:      Extraocular Movements: Extraocular movements intact.      Pupils: Pupils are equal, round, and reactive to light.   Neck:      Musculoskeletal: Normal range of motion and neck supple.   Cardiovascular:      Rate and Rhythm: Normal rate and regular rhythm.   Pulmonary:      Effort: Pulmonary effort is normal.      Breath sounds: Decreased air movement present. Rhonchi present.   Abdominal:      General: Abdomen is flat. There is no distension.      Tenderness: There is no abdominal tenderness.   Musculoskeletal: Normal range of motion.         General: No swelling or deformity.   Skin:     General: Skin is warm and dry.   Neurological:      General: No focal deficit present.      Mental Status: She is  alert. She is confused.      Comments: Does not answer questions, does not follow commands   Psychiatric:         Cognition and Memory: Cognition is impaired.         Laboratory:  Recent Labs     12/25/20  1535   WBC 14.8*   RBC 4.69   HEMOGLOBIN 14.0   HEMATOCRIT 42.1   MCV 89.8   MCH 29.9   MCHC 33.3*   RDW 45.9   PLATELETCT 353   MPV 10.0     Recent Labs     12/25/20  1535   SODIUM 138   POTASSIUM 3.1*   CHLORIDE 102   CO2 25   GLUCOSE 123*   BUN 24*   CREATININE 1.08   CALCIUM 10.4*     Recent Labs     12/25/20  1535   ALTSGPT 28   ASTSGOT 60*   ALKPHOSPHAT 71   TBILIRUBIN 1.1   GLUCOSE 123*         Recent Labs     12/25/20  1535   NTPROBNP 3137*         Recent Labs     12/25/20  1535 12/25/20  1723   TROPONINT 121* 109*       Imaging:  DX-CHEST-PORTABLE (1 VIEW)   Final Result      Peripheral interstitial opacities could be due to chronic inflammatory change, pulmonary edema or Covid 19 pneumonia.      Left basilar atelectasis or scar.      DX-ELBOW-LIMITED 2- LEFT   Final Result      No gross evidence of fracture or dislocation.      CT-HEAD W/O   Final Result      1.  No evidence of acute intracranial process.      2.  Cerebral atrophy as well as periventricular chronic small vessel ischemic change.            Assessment/Plan:  I anticipate this patient will require at least two midnights for appropriate medical management, necessitating inpatient admission.    Pneumonia due to COVID-19 virus  Assessment & Plan  Patient is hypoxic, 4 L nasal cannula  White blood cells elevated 14.8  Patient will be started on Decadron and empiric antibiotics for possible concomitant bacterial infection  Self proning as tolerated  Pulse oximetry, incentive spirometry, wean down oxygen as tolerated      Dementia (HCC)  Assessment & Plan  CT head without contrast showed no acute abnormalities  Limit sedatives  Fall precautions    Elevated troponin  Assessment & Plan  Patient denies chest pain.  History of hypertensive heart  disease. EKG showed sinus rhythm, heart rate 81, ST depression in lateral leads and T wave flattening.  No EKG to compare.  We will order transthoracic echo  Monitor on telemetry, repeat EKG, troponin

## 2020-12-26 NOTE — ED NOTES
Report to THEA Gonzalez.  Aware that patient is on the way and that I am available for any questions or concerns regarding the patient.  Preparing for transfer via gurney w/ O2/tele.  No change in condition on departing ED.

## 2020-12-26 NOTE — ASSESSMENT & PLAN NOTE
Denies chest pain to me today, troponins are trending down.  D dimer is significantly elevated, but family desires no escalation in care, will defer CTA.  Echo from admission without WMA.   Continue baby ASA

## 2020-12-26 NOTE — PROGRESS NOTES
Per Mariia, they are unable to release AD info to me. Son Mike contacted to bring in documents. He says a POLST was done, no POLST on file and not in paper chart from facility.

## 2020-12-26 NOTE — ED NOTES
Fátima Antunez, has been called to assist w/ green top draw.  Waiting release of meds (Rocherafia, K) from pharmacy.  Waiting room assignment.  Dozing, wakes to voice w/ light touch.

## 2020-12-26 NOTE — ASSESSMENT & PLAN NOTE
Pt not clinically overloaded, echo with preserved EF, mild to moderate TR and slightly elevated R heart pressures.

## 2020-12-26 NOTE — ED NOTES
rashad from Lab called with critical result of COVID at positive. Critical lab result read back to rashad.   Dr. wan notified of critical lab result at 1647.  Critical lab result read back by Dr. wan.

## 2020-12-26 NOTE — ED NOTES
Sana from Lab called with critical result of troponin 121 at 1606. Critical lab result read back to Sana.   Dr. Scherer notified of critical lab result at 1607.  Critical lab result read back by Dr. Scherer.  MALINDA Whitt at  for EKG.

## 2020-12-26 NOTE — ASSESSMENT & PLAN NOTE
CT head without contrast showed no acute abnormalities  Limit sedatives - does take Ultram and Lorazepam as an outpatient, will continue here to limit withdrawals  Fall precautions

## 2020-12-26 NOTE — ED PROVIDER NOTES
ED Provider Note    CHIEF COMPLAINT  Chief Complaint   Patient presents with   • T-5000 GLF     mechanical, 0645 this am       HPI  Gerri Duggan is a 89 y.o. female who presents to the emergency room today by ambulance after apparent fall at her nursing home she is from [place].  Apparently fell today injuring her left elbow possibly hitting her head.  When ambulance arrived they found her hypoxic with oxygenation in the low 80% to 86% on room air this is new.  Patient has history of dementia unable to give us a very good history she appeared to be in severe distress secondary respiratory distress/hypoxia.  Slight cough.  Denies chest pain or shortness of breath again patient is very poor historian unable to obtain accurate history because of her history of dementia.  She does complain of some pain to her left elbow from her fall and there is some abrasions noted to the area.    REVIEW OF SYSTEMS  See HPI for further details. All other systems are negative.     PAST MEDICAL HISTORY  Past Medical History:   Diagnosis Date   • Arthritis    • Bronchitis    • Heart Burn    • Indigestion    • Pain    • Unspecified Disorder of Thyroid        FAMILY HISTORY  [unfilled]    SOCIAL HISTORY  Social History     Socioeconomic History   • Marital status:      Spouse name: Not on file   • Number of children: Not on file   • Years of education: Not on file   • Highest education level: Not on file   Occupational History   • Not on file   Social Needs   • Financial resource strain: Not on file   • Food insecurity     Worry: Not on file     Inability: Not on file   • Transportation needs     Medical: Not on file     Non-medical: Not on file   Tobacco Use   • Smoking status: Former Smoker   • Tobacco comment: 1 ppd, 30 yrs, quit 10/09   Substance and Sexual Activity   • Alcohol use: No   • Drug use: No   • Sexual activity: Not on file   Lifestyle   • Physical activity     Days per week: Not on file     Minutes per  session: Not on file   • Stress: Not on file   Relationships   • Social connections     Talks on phone: Not on file     Gets together: Not on file     Attends [employer] service: Not on file     Active member of club or organization: Not on file     Attends meetings of clubs or organizations: Not on file     Relationship status: Not on file   • Intimate partner violence     Fear of current or ex partner: Not on file     Emotionally abused: Not on file     Physically abused: Not on file     Forced sexual activity: Not on file   Other Topics Concern   • Not on file   Social History Narrative   • Not on file       SURGICAL HISTORY  Past Surgical History:   Procedure Laterality Date   • LUMBAR LAMINECTOMY DISKECTOMY  11/3/2009    Performed by KIRTI NAJERA at SURGERY [facility] ORS   • CHOLECYSTECTOMY         CURRENT MEDICATIONS  Home Medications     Reviewed by Magy Stark (Pharmacy Tech) on 12/25/20 at 1437  Med List Status: Complete   Medication Last Dose Status   ezetimibe (ZETIA) 10 MG TABS 12/25/2020 Active   felodipine (PLENDIL) 5 MG TB24 12/25/2020 Active   levothyroxine (SYNTHROID) 112 MCG Tab 12/24/2020 Active   LORazepam (ATIVAN) 0.5 MG Tab 12/24/2020 Active   traMADol (ULTRAM) 50 MG Tab 12/23/2020 Active                ALLERGIES  Allergies   Allergen Reactions   • Clindamycin Unspecified     Per MAR from [place]       PHYSICAL EXAM  VITAL SIGNS: /58   Pulse 83   Temp 36.5 °C (97.7 °F) (Temporal)   Resp (!) 24   SpO2 96%       Constitutional: Well developed, Well nourished, severe distress, Non-toxic appearance.   HENT: Normocephalic, Atraumatic, Bilateral external ears normal, Oropharynx moist, No oral exudates, Nose normal.   Eyes: Conjunctiva normal, No discharge.   Neck: Normal range of motion, No tenderness, Supple, No stridor.   Lymphatic: No lymphadenopathy noted.   Cardiovascular: Normal heart rate, Normal rhythm, No murmurs, No rubs, No gallops.   Thorax & Lungs: Diminished  breath sounds, patient was noted to be in respiratory distress, No wheezing, No chest tenderness.   Abdomen: Bowel sounds normal, Soft, No tenderness, No masses, No pulsatile masses.   Skin: Warm, Dry, No erythema, No rash.  Abrasion left elbow  Back: No tenderness, No CVA tenderness.    Extremities: Intact distal pulses, No edema, No tenderness, No cyanosis, No clubbing.   Musculoskeletal: Good range of motion in all major joints. No tenderness to palpation or major deformities noted.   Neurologic: Alert & oriented x 1 patient has history of dementia, Normal motor function, Normal sensory function, No focal deficits noted.   Psychiatric: Affect normal, Judgment normal, Mood normal.     EKG  Normal sinus rhythm 80 bpm, no ST elevation there is some depression in the lateral leads, no widening of the QRS complex, TX interval's are normal, this is a twelve-lead EKG there is no previous EKG available at this time for comparison.    RADIOLOGY/PROCEDURES  DX-CHEST-PORTABLE (1 VIEW)   Final Result      Peripheral interstitial opacities could be due to chronic inflammatory change, pulmonary edema or Covid 19 pneumonia.      Left basilar atelectasis or scar.      DX-ELBOW-LIMITED 2- LEFT   Final Result      No gross evidence of fracture or dislocation.      CT-HEAD W/O   Final Result      1.  No evidence of acute intracranial process.      2.  Cerebral atrophy as well as periventricular chronic small vessel ischemic change.            COURSE & MEDICAL DECISION MAKING  Pertinent Labs & Imaging studies reviewed. (See chart for details)  Patient had normal CT scan and x-ray there is no evidence of fracture or acute intracranial process.  Chest x-ray showed evidence of inflammation, possible Covid pneumonia patient's Covid test did come back positive most like this is the cause of her hypoxia she is placed on increasing doses of oxygen and cardiac monitor.  Appear to be in severe distress secondary respiratory distress/hypoxia.   Troponin was also mildly elevated this could be secondary to Covid denied any chest pain EKG does not show any evidence of STEMI.  Patient be admitted to the hospital for further treatment plan discussed case with hospitalist.    FINAL IMPRESSION  1.  Acute Covid infection/pneumonia  2.  Respiratory distress/hypoxia  3.  Fall  4.  Dementia  5.  Critical care time of 42 minutes; this includes discussions with hospitalist, review of records discussion and treatment plan, interventions as discussed above, this does not include any procedures.  Please see orders for the plan and care.         Electronically signed by: Trino Scherer D.O., 12/25/2020 5:17 PM

## 2020-12-26 NOTE — PROGRESS NOTES
Telemetry Shift Summary     Rhythm SR  HR Range 73-90  Ectopy  rPVCs, rPACs  Measurements .16/.08/0.44              Normal Values  Rhythm SR  HR Range    Measurements 0.12-0.20 / 0.06-0.10  / 0.30-0.52
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
## 2020-12-27 NOTE — PROGRESS NOTES
0715 Report received from Hollis SIDHU. Plan of care discussed. Safety precautions in place.     1646 Assessment completed, patient is alert and oriented to self. Patient very anxious wanting RN to stay in the room. RN provided emotional support to patient. Patient states something is bothering her on her left side of her hip. Patient has a small round bruise on left hip and has had an incontinent episode. Complete linen change provided. Warm blanket provided for support.     3638 Updated patient's son Mike on plan of care, who stated that he brought advanced directive paperwork and ED scanned documents into patient's chart. Dr. De Jesus notified. Dr. De Jesus also notified of bruise on her left hip. Safety precautions in place.

## 2020-12-27 NOTE — ASSESSMENT & PLAN NOTE
Reported fall PTA, elbow XR negative, hip XR shows mildly impacted left subcapital fracture  Discussed with Dr Padilla with recommendations for weight bearing as tolerated and follow up with TOM in two weeks for repeat imaging to ensure stability

## 2020-12-27 NOTE — PROGRESS NOTES
Telemetry Shift Summary    Rhythm SR/SB  HR Range 60-70  Ectopy r-PVC, Trigem, Coup  Measurements 0.14/0.08/0.38

## 2020-12-27 NOTE — PROGRESS NOTES
Hospital Medicine Daily Progress Note    Date of Service  12/27/2020    Chief Complaint  89 y.o. female admitted 12/25/2020 with GLF and hypoxia     Hospital Course  89 y.o. female with history of dementia, hypothyroidism, GERD, hypertensive heart disease, who presented 12/25/2020 after she was brought from [facility] after ground-level fall, generalized weakness and worsening of confusion.  Therefore history is limited.  Apparently she was complaining of left elbow pain, but x-ray did not show any dislocation or fracture.  Patient noted to be hypoxic with saturation 80 to 86% on room air.  Patient denied chest pain, cough or shortness of breath.  Evaluation in ER revealed that patient is COVID-19 positive.  Chest x-ray showed bilateral pneumonitis.  CT head without contrast showed cerebral atrophy.  Blood work abnormalities include troponin 121, potassium 3.1.  EKG showed sinus rhythm, heart rate 81, ST depression in lateral leads and T wave flattening.  No EKG to compare.    Interval Problem Update  12/26 - Pt remains on 4L O2, continue Decadron - troponins are trending down, no chest pain, awaiting echo results.  D dimer is significantly elevated, PTE could account for elevated troponins.  Renal function is fine, will check CTA today. Procalcitonin is normal, no fever, no focal consolidation on CXR, will stop antibiotics. Attempted to contact her son, Dirk, the phone number listed is disconnected. Will see if her facility has advanced directives on file for her.    12/27 - Advanced directives received from christie Mckeon, but apparently her POLST has not been signed by her PCP so [facility] does not have a completed copy. Will touch base with Palliative tomorrow to enquire about need for POLST as an inpatient. Discussed pt's care with son Mike who states that she would want to continue basic treatment, but no significant escalation in care. She does have a bruise to her L hip and fell PTA, will XR in  order to further guide pain management.  Will not pursue CTA or stress testing etc for her lab abnormalities.  Pt has no complaints today other than hip and back pain. Down to 2L O2.  May be able to discharge in the coming 1-2 days if we do not plan on escalating care if she declines.       Consultants/Specialty  None    Code Status  DNAR/DNI    Disposition  Continued hospitalization     Review of Systems  Review of Systems   Unable to perform ROS: Dementia        Physical Exam  Temp:  [36.1 °C (97 °F)-36.9 °C (98.5 °F)] 36.7 °C (98 °F)  Pulse:  [72-86] 84  Resp:  [16-18] 16  BP: (103-130)/(60-80) 116/65  SpO2:  [90 %-96 %] 92 %    Physical Exam  Vitals signs and nursing note reviewed.   Constitutional:       General: She is not in acute distress.     Appearance: She is not toxic-appearing.   HENT:      Head: Normocephalic and atraumatic.      Mouth/Throat:      Pharynx: Oropharynx is clear.   Cardiovascular:      Rate and Rhythm: Normal rate and regular rhythm.   Pulmonary:      Effort: No respiratory distress.      Breath sounds: No rhonchi.      Comments: Diminished throughout  Abdominal:      General: Abdomen is flat. Bowel sounds are normal.      Palpations: Abdomen is soft.   Musculoskeletal:         General: No swelling.      Comments: Bruise to L hip   Skin:     General: Skin is warm and dry.   Neurological:      General: No focal deficit present.      Mental Status: She is disoriented.      GCS: GCS eye subscore is 4. GCS verbal subscore is 5. GCS motor subscore is 6.         Fluids    Intake/Output Summary (Last 24 hours) at 12/27/2020 1433  Last data filed at 12/27/2020 1300  Gross per 24 hour   Intake 740 ml   Output --   Net 740 ml       Laboratory  Recent Labs     12/25/20  1535 12/26/20  0428 12/27/20  0434   WBC 14.8* 13.5* 16.3*   RBC 4.69 4.57 4.76   HEMOGLOBIN 14.0 13.5 14.1   HEMATOCRIT 42.1 41.6 43.0   MCV 89.8 91.0 90.3   MCH 29.9 29.5 29.6   MCHC 33.3* 32.5* 32.8*   RDW 45.9 48.0 47.3    PLATELETCT 353 325 354   MPV 10.0 10.5 10.7     Recent Labs     12/25/20  1535 12/26/20  0428 12/27/20  0434   SODIUM 138 141 142   POTASSIUM 3.1* 3.0* 3.5*   CHLORIDE 102 103 106   CO2 25 28 26   GLUCOSE 123* 92 99   BUN 24* 21 20   CREATININE 1.08 0.72 0.60   CALCIUM 10.4* 10.0 10.4*                   Imaging  EC-ECHOCARDIOGRAM COMPLETE W/O CONT         DX-CHEST-PORTABLE (1 VIEW)   Final Result      Peripheral interstitial opacities could be due to chronic inflammatory change, pulmonary edema or Covid 19 pneumonia.      Left basilar atelectasis or scar.      DX-ELBOW-LIMITED 2- LEFT   Final Result      No gross evidence of fracture or dislocation.      CT-HEAD W/O   Final Result      1.  No evidence of acute intracranial process.      2.  Cerebral atrophy as well as periventricular chronic small vessel ischemic change.      DX-HIP-BILATERAL-WITH PELVIS-2 VIEWS    (Results Pending)        Assessment/Plan  Pneumonia due to COVID-19 virus  Assessment & Plan  Patient is hypoxic, 2 L nasal cannula  Continue Decadron  Pulse oximetry, incentive spirometry, wean down oxygen as tolerated      Fall  Assessment & Plan  Reported fall PTA, elbow XR negative and now complaining of L hip pain, bruise present.  Will XR hip to help us direct pain management     Elevated brain natriuretic peptide (BNP) level  Assessment & Plan  Pt not clinically overloaded, will await echo results     Elevated AST (SGOT)  Assessment & Plan  Likely secondary to COVID.    No escalation in case desired by son, will hold off on RUQ u/s    Hypokalemia   Assessment & Plan  Replace orally, recheck K+ and Mag in the am       Dementia (HCC)  Assessment & Plan  CT head without contrast showed no acute abnormalities  Limit sedatives - does take Ultram and Lorazepam as an outpatient, will continue here to limit withdrawals  Fall precautions    Elevated troponin  Assessment & Plan  Denies chest pain to me today, troponins are trending down.  D dimer is  significantly elevated, but family desires no escalation in care, will defer CTA.  Echo from admission pending.   Continue baby ASA        VTE prophylaxis: Heparin

## 2020-12-28 PROBLEM — S72.012A CLOSED SUBCAPITAL FRACTURE OF FEMUR, LEFT, INITIAL ENCOUNTER (HCC): Status: ACTIVE | Noted: 2020-01-01

## 2020-12-28 PROBLEM — I47.10 PSVT (PAROXYSMAL SUPRAVENTRICULAR TACHYCARDIA) (HCC): Status: ACTIVE | Noted: 2020-01-01

## 2020-12-28 NOTE — PROGRESS NOTES
Report received from THEA Wagner. Dx, medications, and poc reviewed. Pt has no acute needs at this time. Care fully assumed.    0900 Baseline assessment complete-See Epic flowsheet; pt tolerated well. VSS, no acute distress  or c/o pain at this time. Safety/fall precautions in place, bed alarm activated, call bell/bedside table within reach. No other acute needs. [facility].    1307 Spoke with pt's son, Mike and provided full update on pt status. Discussed L Hip xray and pending Ortho consult. All questions/concerns addressed. Total call time 3 minutes.    1940 Report given to THEA Fontana. Pt has no acute needs at this time. Care fully relinquished.

## 2020-12-28 NOTE — CARE PLAN
Problem: Nutritional:  Goal: Achieve adequate nutritional intake  Description: Patient will consume >50% of meals and supplements.  Outcome: NOT MET

## 2020-12-28 NOTE — PROGRESS NOTES
Received shift handoff report from Kathleen SIDHU. Pt is in bed and stable. A&Ox1.  Bed alarm is on. Bed locked and in lowest position, upper side rails up, call light in reach, whiteboard updated. POC discussed.

## 2020-12-28 NOTE — DIETARY
"Nutrition services: Day 3 of admit.  Gerri Duggan is a 89 y.o. female with admitting DX of Pneumonia due to COVID-19 virus. DX includes dementia, hypothyroid, GERD, hypertensive heart disease.    Pt noted to have poor PO intake during re-screen.      Assessment:  Height: 165.1 cm (5' 5\")  Weight: 58 kg (127 lb 13.9 oz)  Body mass index is 21.28 kg/m²., BMI classification: underweight based on her age  Diet/Intake: cardiac/<25% of most meals    Evaluation:   1. Pt with poor PO intake. She also has a dx of dementia and is at an advanced age. With advancement in age and dx of dementia, pt may not be able to taste her low sodium/fat foods. She may also prefer sweet, creamy, cool foods. Request made to MD to liberalize pt's cardiac diet to regular and add Boost Plus TID with meals. MD agreed to make changes.  2. GI: no recorded BMs   3. Labs: 12/28: potassium=2.9 (L)  4. Meds: Decadron, KCl, senna-docusate (scheduled and provided). Pt also with PRN laxatives.      Malnutrition Risk: unknown    Recommendations/Plan:  1. Liberalize Cardiac diet to regular  2. Add Boost Plus to meals TID.    3. Encourage intake of >50%  4. Document intake of all meals and supplements as % taken in ADL's to provide interdisciplinary communication across all shifts.   5. Monitor weight.  6. Nutrition rep will continue to see patient for ongoing meal and snack preferences.     RD will follow.        "

## 2020-12-28 NOTE — PROGRESS NOTES
Telemetry Shift Summary     Rhythm SR, ST  HR Range , 200s for 6 seconds of PSVT at 2335  Ectopy  rPVCs, rCouplets  Measurements .14/.08/.36              Normal Values  Rhythm SR  HR Range    Measurements 0.12-0.20 / 0.06-0.10  / 0.30-0.52

## 2020-12-28 NOTE — PROGRESS NOTES
Hospital Medicine Daily Progress Note    Date of Service  12/28/2020    Chief Complaint  89 y.o. female admitted 12/25/2020 with GLF and hypoxia     Hospital Course  89 y.o. female with history of dementia, hypothyroidism, GERD, hypertensive heart disease, who presented 12/25/2020 after she was brought from [facility] after ground-level fall, generalized weakness and worsening of confusion.  Therefore history is limited.  Apparently she was complaining of left elbow pain, but x-ray did not show any dislocation or fracture.  Patient noted to be hypoxic with saturation 80 to 86% on room air.  Patient denied chest pain, cough or shortness of breath.  Evaluation in ER revealed that patient is COVID-19 positive.  Chest x-ray showed bilateral pneumonitis.  CT head without contrast showed cerebral atrophy.  Blood work abnormalities include troponin 121, potassium 3.1.  EKG showed sinus rhythm, heart rate 81, ST depression in lateral leads and T wave flattening.  No EKG to compare.    Interval Problem Update  12/26 - Pt remains on 4L O2, continue Decadron - troponins are trending down, no chest pain, awaiting echo results.  D dimer is significantly elevated, PTE could account for elevated troponins.  Renal function is fine, will check CTA today. Procalcitonin is normal, no fever, no focal consolidation on CXR, will stop antibiotics. Attempted to contact her son, Dirk, the phone number listed is disconnected. Will see if her facility has advanced directives on file for her.    12/27 - Advanced directives received from christie Mckeon, but apparently her POLST has not been signed by her PCP so [facility] does not have a completed copy. Will touch base with Palliative tomorrow to enquire about need for POLST as an inpatient. Discussed pt's care with son Mike who states that she would want to continue basic treatment, but no significant escalation in care. She does have a bruise to her L hip and fell PTA, will XR in  order to further guide pain management.  Will not pursue CTA or stress testing etc for her lab abnormalities.  Pt has no complaints today other than hip and back pain. Down to 2L O2.  May be able to discharge in the coming 1-2 days if we do not plan on escalating care if she declines.       12/28 - Hip fracture confirmed on XR, discussed with Dr Padilla who recommends weight bearing as tolerated and repeat imaging as an outpatient to confirm stability.  Nursing reports that pt's pain seems controlled with Ultram. Had an episode of PSVT overnight, again, son does not want to escalate care, will stop Felodipine and replace with Metoprolol. Will replace potassium as well - start scheduled 40mEq BID orally. Unclear what her baseline ambulatory status is - if she ambulates at her memory care unit, will consult PT. If she is bed and wheelchair bound, will not.  Discussed with nursing who will contact Betsy to clarify. Boost ordered for poor nutritional status. As son does not wish for escalation in care, pt could potentially discharge back to her memory care unit - CM is in contact with them to see what their COVID policy is.    Consultants/Specialty  None    Code Status  DNAR/DNI    Disposition  Continued hospitalization     Review of Systems  Review of Systems   Unable to perform ROS: Dementia        Physical Exam  Temp:  [36.7 °C (98.1 °F)-37.2 °C (98.9 °F)] 36.7 °C (98.1 °F)  Pulse:  [] 82  Resp:  [16-18] 18  BP: (111-146)/(66-80) 111/66  SpO2:  [88 %-94 %] 88 %    Physical Exam  Vitals signs and nursing note reviewed.   Constitutional:       General: She is not in acute distress.     Appearance: She is not toxic-appearing.   HENT:      Head: Normocephalic and atraumatic.      Mouth/Throat:      Pharynx: Oropharynx is clear.   Cardiovascular:      Rate and Rhythm: Normal rate and regular rhythm.   Pulmonary:      Effort: No respiratory distress.      Breath sounds: No rhonchi.      Comments: Diminished  throughout  Abdominal:      General: Abdomen is flat. Bowel sounds are normal.      Palpations: Abdomen is soft.   Musculoskeletal:         General: No swelling.      Comments: Bruise to L hip   Skin:     General: Skin is warm and dry.   Neurological:      General: No focal deficit present.      Mental Status: She is disoriented.      GCS: GCS eye subscore is 4. GCS verbal subscore is 5. GCS motor subscore is 6.         Fluids    Intake/Output Summary (Last 24 hours) at 12/28/2020 1227  Last data filed at 12/27/2020 1300  Gross per 24 hour   Intake 200 ml   Output --   Net 200 ml       Laboratory  Recent Labs     12/25/20  1535 12/26/20  0428 12/27/20  0434   WBC 14.8* 13.5* 16.3*   RBC 4.69 4.57 4.76   HEMOGLOBIN 14.0 13.5 14.1   HEMATOCRIT 42.1 41.6 43.0   MCV 89.8 91.0 90.3   MCH 29.9 29.5 29.6   MCHC 33.3* 32.5* 32.8*   RDW 45.9 48.0 47.3   PLATELETCT 353 325 354   MPV 10.0 10.5 10.7     Recent Labs     12/26/20  0428 12/27/20  0434 12/28/20  0249   SODIUM 141 142 138   POTASSIUM 3.0* 3.5* 2.9*   CHLORIDE 103 106 101   CO2 28 26 25   GLUCOSE 92 99 108*   BUN 21 20 16   CREATININE 0.72 0.60 0.62   CALCIUM 10.0 10.4* 9.5                   Imaging  DX-HIP-UNILATERAL-WITH PELVIS-1 VIEW LEFT   Final Result      Deformity of the left proximal femur most likely representing a mildly impacted left subcapital fracture.      EC-ECHOCARDIOGRAM COMPLETE W/O CONT   Final Result      DX-CHEST-PORTABLE (1 VIEW)   Final Result      Peripheral interstitial opacities could be due to chronic inflammatory change, pulmonary edema or Covid 19 pneumonia.      Left basilar atelectasis or scar.      DX-ELBOW-LIMITED 2- LEFT   Final Result      No gross evidence of fracture or dislocation.      CT-HEAD W/O   Final Result      1.  No evidence of acute intracranial process.      2.  Cerebral atrophy as well as periventricular chronic small vessel ischemic change.           Assessment/Plan  Pneumonia due to COVID-19 virus  Assessment &  Plan  Patient is hypoxic, 2 L nasal cannula  Continue Decadron  Pulse oximetry, incentive spirometry, wean down oxygen as tolerated      PSVT (paroxysmal supraventricular tachycardia) ([facility])  Assessment & Plan  Stop Felodipine and replace with Metoprolol    Closed subcapital fracture of femur, left, initial encounter ([facility])  Assessment & Plan  Reported fall PTA, elbow XR negative, hip XR shows mildly impacted left subcapital fracture  Discussed with Dr Padilla with recommendations for weight bearing as tolerated and follow up with TOM in two weeks for repeat imaging to ensure stability       Elevated brain natriuretic peptide (BNP) level  Assessment & Plan  Pt not clinically overloaded, echo with preserved EF, mild to moderate TR and slightly elevated R heart pressures.    Elevated AST (SGOT)  Assessment & Plan  Likely secondary to COVID.    No escalation in case desired by son, will hold off on RUQ u/s    Hypokalemia   Assessment & Plan  Start scheduled replacement, 40meq BID for now, may decrease dosing when K+ levels not significantly depressed       Dementia ([facility])  Assessment & Plan  CT head without contrast showed no acute abnormalities  Limit sedatives - does take Ultram and Lorazepam as an outpatient, will continue here to limit withdrawals  Fall precautions    Elevated troponin  Assessment & Plan  Denies chest pain to me today, troponins are trending down.  D dimer is significantly elevated, but family desires no escalation in care, will defer CTA.  Echo from admission without WMA.   Continue baby ASA        VTE prophylaxis: Heparin

## 2020-12-28 NOTE — DISCHARGE PLANNING
Anticipated Discharge Disposition: Back to [facility]    Action: Spoke with Kamala at [facility] ([phone]) about accepting the patient back. Kamala stated that she will have to check policies on COVID and requested a call tomorrow after 11 am.    Barriers to Discharge: Acceptance back to [facility]    Plan: Will need to follow up with Kamala at [facility].

## 2020-12-28 NOTE — PROGRESS NOTES
1620 Updated MD about x ray results.     Telemetry Shift Summary    Rhythm SR-ST  HR Range   Ectopy R-O PVCs, R trigem, R couplets  Measurements .14/.08/.36        Normal Values  Rhythm SR  HR Range    Measurements 0.12-0.20 / 0.06-0.10  / 0.30-0.52

## 2020-12-28 NOTE — PROGRESS NOTES
0100 Patient agitated and removed her IV. Patient given 0.5mg PO ativan.     0200 Oscar SIDHU placed new 22g PIV in right wrist.

## 2020-12-28 NOTE — DOCUMENTATION QUERY
[facility]                                                                       Query Response Note      PATIENT:               ARIELLA MORRIS  ACCT #:                  [id number]  MRN:                     [id number]  :                      [id number]  ADMIT DATE:       2020 1:34 PM  DISCH DATE:          RESPONDING  PROVIDER #:        [id number]           QUERY TEXT:    Abnormal respiratory assessment findings with hypoxia, respiratory distress and increased 02 demand are documented in the Medical Record. Please further specify the need for increased 02 (includes suspected or probable)    NOTE:  If an appropriate response is not listed below, please respond with a new note.    The patient's Clinical Indicators include:  - Findings:  Per ED provider note:  When ambulance  arrived they found her hypoxic with oxygenation in the low 80% to 86% on room air this is new.  she appeared to be in severe distress secondary respiratory distress/hypoxia. Rhonchi.  RR 16 - 34, HR 72 - 118, Sp02 on 1 -4 L NC 88 - 100 %  - Treatments:  02, chest x ray, decdron, proning   - Risk factors:  COVID, PNA, hypoxia, respiratory distress, dementai  Options provided:   -- Acute respiratory failure with hypoxia   -- Acute on chronic respiratory failure with hypoxia   -- Findings of no clinical significance   -- Unable to determine      Query created by: Tanya Beavers on 2020 8:57 AM    RESPONSE TEXT:    Acute respiratory failure with hypoxia          Electronically signed by:  GABRIELA PHILLIPS MD 2020 12:22 PM

## 2020-12-28 NOTE — PROGRESS NOTES
Patient assessed, A&Ox1 only oriented to person, patient CO pain when moving. Pain seems to originate from left hip. Patient lungs are diminished in all lobes. Night time meds administered. Pt has no questions at this time.

## 2020-12-28 NOTE — THERAPY
"Speech Language Pathology   Clinical Swallow Evaluation     Patient Name: Gerri Duggan  AGE:  89 y.o., SEX:  female  Medical Record #: [id number]  Today's Date: 12/28/2020     Precautions: Fall Risk, Swallow Precautions (See Comments)    Assessment    Patient is 89 y.o. female \"with history of dementia, hypothyroidism, GERD, hypertensive heart disease, who presented 12/25/2020 after she was brought from [facility] after ground-level fall, generalized weakness and worsening of confusion... Evaluation in ER revealed that patient is COVID-19 positive.  Chest x-ray showed bilateral pneumonitis.\" CXR:  Peripheral interstitial opacities could be due to chronic inflammatory change, pulmonary edema or Covid 19 pneumonia. No SLP hx on file.    Pt was seen today for CSE. Per RN, Pt has been sleeping most of the morning and was agitated, but arousable, when woken up. Pt kept eyes closed for 90% of session, and was restless throughout exam (as she attempted to pull off O2, take off gown, remove BP cuff, etc.) Pt was unable to provide case hx and/or [facility], likely due to dementia. Pt was provided with PO trials (as noted below.) Pt had difficulties consuming teaspoons thin water, and would not close lips around spoon. Pt was then provided with small cup sip of thin water instead, and had strong, overt coughing that required >30-seconds to resolve. Pt was then provided with mildly thick liquids via cup, and tolerated well with no further coughing/choking or other clinical s/sx of aspiration/penetration. Pt required mod cues to consume small bites of applesauce and pudding, and had bolus holding vs. delayed swallow initiation ~20-seconds, with no overt clinical s/sx of aspiration/penetration after the swallow. With last bite of pudding, Pt had delayed throat clear; however, this was not appreciated throughout remainder of exam. No further trials given Pt's increasing agitation and inability " to follow directives.    At this time, recommend PO diet downgrade to Liquidised solids, Mildly thick liquids and meds crushed in puree. Please provide 1:1 feeding and hold PO if any difficulties, concerns or change in status. Pt must be AWAKE, ALERT, and upright at 90 degrees for all PO intake. Thank you.     Plan    Recommend Speech Therapy 3 times per week until therapy goals are met for the following treatments:  Dysphagia Training and Patient / Family / Caregiver Education.    Discharge Recommendations: Recommend post-acute placement for additional speech therapy services prior to discharge home     Objective       12/28/20 1330   Prior Level Of Function   Communication Impaired  (hx dementia)   Swallow Unknown   Dentition   (Pt orally defensive; unable to view oral cavity fully)   Oral Motor Eval    Is Patient Able to Complete Oral Motor Eval No   Barriers To Oral Feeding   Barriers To Oral Feeding Impaired Cognition / Attention;Agitation   Pre-Feeding Oral Stimulation Trial Inconsistent Responses   Laryngeal Function   Voice Quality   (Pt did not verbalize/vocalize this session)   Volutional Cough Within Functional Limits   Excursion Upon Swallow Complete   Oral Food Presentation   Ice Chips Not Tested   Single Swallow Mildly Thick (2) - (Nectar Thick)  Within Functional Limits   Serial Swallow Mildly Thick (2) - (Nectar Thick) Not Tested   Single Swallow Thin (0) Severe   Serial Swallow Thin (0) Not Tested   Liquidised (3) Within Functional Limits   Pureed (4) Minimal  (delayed throat clear at end of session)   Minced & Moist (5) - (Dysphagia II) Not Tested   Regular (7) Not Tested   Self Feeding Needs Total Assistance   Dysphagia Strategies / Recommendations   Compensatory Strategies Strict 1:1 Feeding;Head of Bed 90 Degrees During Eating / Drinking;Single Sips / Bites;No Talking During Eating / Drinking   Diet / Liquid Recommendation Liquidised (3) - (Nectar Thick Full Liquid);Mildly Thick (2) - (Nectar  Thick)   Medication Administration  Crush all Medications in Puree   Therapy Interventions Dysphagia Therapy By Speech Language Pathologist;One To One Supervision With Nursing   Short Term Goals   Short Term Goal # 1 Pt will consume PO diet of LQ3/MT2 with no clinical s/sx of aspiration/penetration.

## 2020-12-29 NOTE — PROGRESS NOTES
Telemetry Strip     Strip printed: yes  Rhythm: SR/ST  HR: 79  Measurements: 0.16/ 0.08/ 0.36  Ectopy: r coup, o   Pvc, r PAC              Normal Values  Rhythm SR  HR Range    Measurements 0.12-0.20 / 0.06-0.10  / 0.30-0.52

## 2020-12-29 NOTE — PROGRESS NOTES
Dr Fleming says it is ok to leave out her IV. She is also agitated and pulling off her tele and she keeps screaming.

## 2020-12-29 NOTE — PROGRESS NOTES
Telemetry Shift Summary     Rhythm SR, ST  HR Range 66-82  Ectopy  rPVCs, rCouplets  Measurements 0.16/0.08/0.40              Normal Values  Rhythm SR  HR Range    Measurements 0.12-0.20 / 0.06-0.10  / 0.30-0.52

## 2020-12-29 NOTE — PROGRESS NOTES
Hospital Medicine Daily Progress Note    Date of Service  12/29/2020    Chief Complaint  89 y.o. female admitted 12/25/2020 with GLF and hypoxia     Hospital Course  89 y.o. female with history of dementia, hypothyroidism, GERD, hypertensive heart disease, who presented 12/25/2020 after she was brought from [facility] after ground-level fall, generalized weakness and worsening of confusion.  Therefore history is limited.  Apparently she was complaining of left elbow pain, but x-ray did not show any dislocation or fracture.  Patient noted to be hypoxic with saturation 80 to 86% on room air.  Patient denied chest pain, cough or shortness of breath.  Evaluation in ER revealed that patient is COVID-19 positive.  Chest x-ray showed bilateral pneumonitis.  CT head without contrast showed cerebral atrophy.  Blood work abnormalities include troponin 121, potassium 3.1.  EKG showed sinus rhythm, heart rate 81, ST depression in lateral leads and T wave flattening.  No EKG to compare.    Interval Problem Update  12/26 - Pt remains on 4L O2, continue Decadron - troponins are trending down, no chest pain, awaiting echo results.  D dimer is significantly elevated, PTE could account for elevated troponins.  Renal function is fine, will check CTA today. Procalcitonin is normal, no fever, no focal consolidation on CXR, will stop antibiotics. Attempted to contact her son, Dirk, the phone number listed is disconnected. Will see if her facility has advanced directives on file for her.    12/27 - Advanced directives received from christie Mckeon, but apparently her POLST has not been signed by her PCP so [facility] does not have a completed copy. Will touch base with Palliative tomorrow to enquire about need for POLST as an inpatient. Discussed pt's care with son Mike who states that she would want to continue basic treatment, but no significant escalation in care. She does have a bruise to her L hip and fell PTA, will XR in  order to further guide pain management.  Will not pursue CTA or stress testing etc for her lab abnormalities.  Pt has no complaints today other than hip and back pain. Down to 2L O2.  May be able to discharge in the coming 1-2 days if we do not plan on escalating care if she declines.       12/28 - Hip fracture confirmed on XR, discussed with Dr Padilla who recommends weight bearing as tolerated and repeat imaging as an outpatient to confirm stability.  Nursing reports that pt's pain seems controlled with Ultram. Had an episode of PSVT overnight, again, son does not want to escalate care, will stop Felodipine and replace with Metoprolol. Will replace potassium as well - start scheduled 40mEq BID orally. Unclear what her baseline ambulatory status is - if she ambulates at her memory care unit, will consult PT. If she is bed and wheelchair bound, will not.  Discussed with nursing who will contact Betsy to clarify. Boost ordered for poor nutritional status. As son does not wish for escalation in care, pt could potentially discharge back to her memory care unit - CM is in contact with them to see what their COVID policy is.  12/29: No overnight events.  Patient is currently on 1 L O2.  T-max 98.7, /66.  Potassium is 3.2 and continued on twice daily potassium chloride.  Awaiting placement    Consultants/Specialty  None    Code Status  DNAR/DNI    Disposition  Awaiting placement    Review of Systems  Review of Systems   Unable to perform ROS: Dementia        Physical Exam  Temp:  [36.3 °C (97.3 °F)-37.4 °C (99.3 °F)] 36.4 °C (97.5 °F)  Pulse:  [] 84  Resp:  [18] 18  BP: (105-170)/(65-96) 105/75  SpO2:  [89 %-94 %] 90 %    Physical Exam  Vitals signs and nursing note reviewed.   Constitutional:       General: She is not in acute distress.     Appearance: She is not toxic-appearing.   HENT:      Head: Normocephalic and atraumatic.      Mouth/Throat:      Pharynx: Oropharynx is clear.   Eyes:      General:  No scleral icterus.  Cardiovascular:      Rate and Rhythm: Normal rate and regular rhythm.      Heart sounds: Normal heart sounds.   Pulmonary:      Effort: No respiratory distress.      Breath sounds: No wheezing or rhonchi.      Comments: Diminished throughout  Abdominal:      General: Abdomen is flat. Bowel sounds are normal. There is no distension.      Palpations: Abdomen is soft.      Tenderness: There is no abdominal tenderness.   Musculoskeletal:         General: No swelling or tenderness.      Comments: Bruise to L hip   Skin:     General: Skin is warm and dry.      Coloration: Skin is not jaundiced.   Neurological:      General: No focal deficit present.      Mental Status: She is alert. She is disoriented.      GCS: GCS eye subscore is 4. GCS verbal subscore is 5. GCS motor subscore is 6.         Fluids  No intake or output data in the 24 hours ending 12/29/20 1433    Laboratory  Recent Labs     12/27/20  0434 12/29/20  0426   WBC 16.3* 9.4   RBC 4.76 4.98   HEMOGLOBIN 14.1 14.6   HEMATOCRIT 43.0 45.5   MCV 90.3 91.4   MCH 29.6 29.3   MCHC 32.8* 32.1*   RDW 47.3 47.7   PLATELETCT 354 284   MPV 10.7 11.1     Recent Labs     12/27/20  0434 12/28/20  0249 12/29/20  0426   SODIUM 142 138 144   POTASSIUM 3.5* 2.9* 3.2*   CHLORIDE 106 101 106   CO2 26 25 25   GLUCOSE 99 108* 90   BUN 20 16 27*   CREATININE 0.60 0.62 0.57   CALCIUM 10.4* 9.5 10.3*                   Imaging  DX-HIP-UNILATERAL-WITH PELVIS-1 VIEW LEFT   Final Result      Deformity of the left proximal femur most likely representing a mildly impacted left subcapital fracture.      EC-ECHOCARDIOGRAM COMPLETE W/O CONT   Final Result      DX-CHEST-PORTABLE (1 VIEW)   Final Result      Peripheral interstitial opacities could be due to chronic inflammatory change, pulmonary edema or Covid 19 pneumonia.      Left basilar atelectasis or scar.      DX-ELBOW-LIMITED 2- LEFT   Final Result      No gross evidence of fracture or dislocation.      CT-HEAD W/O    Final Result      1.  No evidence of acute intracranial process.      2.  Cerebral atrophy as well as periventricular chronic small vessel ischemic change.           Assessment/Plan  Pneumonia due to COVID-19 virus  Assessment & Plan  Hypoxia is improving currently on 1 L  Continue Decadron  Pulse oximetry, incentive spirometry, wean down oxygen as tolerated      PSVT (paroxysmal supraventricular tachycardia) ([facility])  Assessment & Plan  Stop Felodipine and replace with Metoprolol    Closed subcapital fracture of femur, left, initial encounter ([facility])  Assessment & Plan  Reported fall PTA, elbow XR negative, hip XR shows mildly impacted left subcapital fracture  Discussed with Dr Padilla with recommendations for weight bearing as tolerated and follow up with TOM in two weeks for repeat imaging to ensure stability       Elevated brain natriuretic peptide (BNP) level  Assessment & Plan  Pt not clinically overloaded, echo with preserved EF, mild to moderate TR and slightly elevated R heart pressures.    Elevated AST (SGOT)  Assessment & Plan  Likely secondary to COVID.    No escalation in case desired by son, will hold off on RUQ u/s    Hypokalemia   Assessment & Plan  Start scheduled replacement, 40meq BID for now, may decrease dosing when K+ levels not significantly depressed       Dementia ([facility])  Assessment & Plan  CT head without contrast showed no acute abnormalities  Limit sedatives - does take Ultram and Lorazepam as an outpatient, will continue here to limit withdrawals  Fall precautions    Elevated troponin  Assessment & Plan  Denies chest pain to me today, troponins are trending down.  D dimer is significantly elevated, but family desires no escalation in care, will defer CTA.  Echo from admission without WMA.   Continue baby ASA        VTE prophylaxis: Heparin

## 2020-12-29 NOTE — PROGRESS NOTES
Telemetry Shift Summary     Rhythm SR, ST  HR Range 66-82  Ectopy  r coup, o PVC, r PAC  Measurements 0.16/0.08/0.40       Normal Values  Rhythm SR  HR Range    Measurements 0.12-0.20 / 0.06-0.10  / 0.30-0.52

## 2020-12-29 NOTE — PROGRESS NOTES
Report received from THEA Fontana. DX, medications, lack of IV access, pt's persistent agitation overnight and poc reviewed. Pt has no acute needs at this time. Care fully assumed.    0830 Baseline assessment complete-See Epic flowsheet;pt tolerated poorly;very agitated, screaming and thrashing about in bed. No IV access/tele monitors as pt removed them during previous shift. Safety/fall measures in place, call bell/personal belongings/bedside table within reach. Sitter at bedside for safety.     0945 Pt's extreme agitation, restlessness and yelling continue. PRN Lorazepam 0.5 mg po to be given. MD notified for possible adjustment to medication. WCTM.     1210 Pt's extreme agitation, restlessness and yelling continue;BP elevated. PRN haloperidol 1 mg po and clonidine 0.1 mg po given. WCTM.      1810 EOS rounding complete. Pt has no acute needs at this time. WCTM.    1916 Report given to THEA Cueto. Pt has no acute needs at this time. Care fully relinquished.      DISPLAY PLAN FREE TEXT DISPLAY PLAN FREE TEXT DISPLAY PLAN FREE TEXT DISPLAY PLAN FREE TEXT DISPLAY PLAN FREE TEXT DISPLAY PLAN FREE TEXT

## 2020-12-30 NOTE — PROGRESS NOTES
Returned call to pt's sonMike with full update on pt status. All questions answered. Total call time approximately 5 minutes.

## 2020-12-30 NOTE — CARE PLAN
Problem: Safety  Goal: Will remain free from injury  Outcome: PROGRESSING SLOWER THAN EXPECTED  Note: Pt remains confused and not following commands. Very weak. High risk fall.      Problem: Respiratory:  Goal: Respiratory status will improve  Outcome: PROGRESSING SLOWER THAN EXPECTED  Intervention: Assess and monitor pulmonary status  Note: Needs constant reinforcement to keep oxygen on. On 2l o2 nc. No respiratory distress noted.

## 2020-12-30 NOTE — CARE PLAN
Problem: Nutritional:  Goal: Achieve adequate nutritional intake  Description: Patient will consume >50% of meals and supplements.  Outcome: PROGRESSING SLOWER THAN EXPECTED  See RD note for details

## 2020-12-30 NOTE — PROGRESS NOTES
Report given to THAE Brito. Dx, medications, O2 needs and poc reviewed. Pt has no acute needs at this time. Care fully relinquished.

## 2020-12-30 NOTE — DISCHARGE PLANNING
Anticipated Discharge Disposition: Back to [facility]     Action: Left a vm for Kamala at [facility] ([phone]) about accepting the patient back and requesting a return call.     Barriers to Discharge: Acceptance back to [facility]     Plan: Will need to follow up with Kamala at [facility].

## 2020-12-30 NOTE — PROGRESS NOTES
Hospital Medicine Daily Progress Note    Date of Service  12/30/2020    Chief Complaint  89 y.o. female admitted 12/25/2020 with GLF and hypoxia     Hospital Course  89 y.o. female with history of dementia, hypothyroidism, GERD, hypertensive heart disease, who presented 12/25/2020 after she was brought from [facility] after ground-level fall, generalized weakness and worsening of confusion.  Therefore history is limited.  Apparently she was complaining of left elbow pain, but x-ray did not show any dislocation or fracture.  Patient noted to be hypoxic with saturation 80 to 86% on room air.  Patient denied chest pain, cough or shortness of breath.  Evaluation in ER revealed that patient is COVID-19 positive.  Chest x-ray showed bilateral pneumonitis.  CT head without contrast showed cerebral atrophy.  Blood work abnormalities include troponin 121, potassium 3.1.  EKG showed sinus rhythm, heart rate 81, ST depression in lateral leads and T wave flattening.  No EKG to compare.    Interval Problem Update  12/26 - Pt remains on 4L O2, continue Decadron - troponins are trending down, no chest pain, awaiting echo results.  D dimer is significantly elevated, PTE could account for elevated troponins.  Renal function is fine, will check CTA today. Procalcitonin is normal, no fever, no focal consolidation on CXR, will stop antibiotics. Attempted to contact her son, Dirk, the phone number listed is disconnected. Will see if her facility has advanced directives on file for her.    12/27 - Advanced directives received from christie Mckeon, but apparently her POLST has not been signed by her PCP so [facility] does not have a completed copy. Will touch base with Palliative tomorrow to enquire about need for POLST as an inpatient. Discussed pt's care with son Mike who states that she would want to continue basic treatment, but no significant escalation in care. She does have a bruise to her L hip and fell PTA, will XR in  order to further guide pain management.  Will not pursue CTA or stress testing etc for her lab abnormalities.  Pt has no complaints today other than hip and back pain. Down to 2L O2.  May be able to discharge in the coming 1-2 days if we do not plan on escalating care if she declines.       12/28 - Hip fracture confirmed on XR, discussed with Dr Padilla who recommends weight bearing as tolerated and repeat imaging as an outpatient to confirm stability.  Nursing reports that pt's pain seems controlled with Ultram. Had an episode of PSVT overnight, again, son does not want to escalate care, will stop Felodipine and replace with Metoprolol. Will replace potassium as well - start scheduled 40mEq BID orally. Unclear what her baseline ambulatory status is - if she ambulates at her memory care unit, will consult PT. If she is bed and wheelchair bound, will not.  Discussed with nursing who will contact Betsy to clarify. Boost ordered for poor nutritional status. As son does not wish for escalation in care, pt could potentially discharge back to her memory care unit -  is in contact with them to see what their COVID policy is.  12/29: No overnight events.  Patient is currently on 1 L O2.  T-max 98.7, /66.  Potassium is 3.2 and continued on twice daily potassium chloride.  Awaiting placement  12/30: Overnight patient had an episode of agitation.  Haldol has been discontinued since there appeared to be a prolonged QTC, repeat EKG has been ordered to reevaluate the QTC.  White count is 12.1, potassium 3.6.  T-max 98, /59.  Patient is on 3 L O2.    I have performed a physical exam and reviewed and updated ROS and Plan today (12/30/2020). In review of yesterday's note (12/29/2020), there are no changes except as documented above.       Consultants/Specialty  None    Code Status  DNAR/DNI    Disposition  Awaiting placement    Review of Systems  Review of Systems   Unable to perform ROS: Dementia         Physical Exam  Temp:  [36.6 °C (97.9 °F)-36.9 °C (98.4 °F)] 36.9 °C (98.4 °F)  Pulse:  [83-93] 93  Resp:  [18-22] 18  BP: (134-152)/(59-90) 141/82  SpO2:  [86 %-92 %] 88 %    Physical Exam  Vitals signs and nursing note reviewed.   Constitutional:       General: She is not in acute distress.     Appearance: She is not toxic-appearing.   HENT:      Head: Normocephalic and atraumatic.      Mouth/Throat:      Pharynx: Oropharynx is clear.   Eyes:      General: No scleral icterus.  Cardiovascular:      Rate and Rhythm: Normal rate and regular rhythm.      Heart sounds: Normal heart sounds.   Pulmonary:      Effort: No respiratory distress.      Breath sounds: No wheezing or rhonchi.      Comments: Diminished throughout  Abdominal:      General: Abdomen is flat. Bowel sounds are normal. There is no distension.      Palpations: Abdomen is soft.      Tenderness: There is no abdominal tenderness.   Musculoskeletal:         General: No swelling or tenderness.      Comments: Bruise to L hip   Skin:     General: Skin is warm and dry.      Coloration: Skin is not jaundiced.   Neurological:      General: No focal deficit present.      Mental Status: She is alert. She is disoriented.      GCS: GCS eye subscore is 4. GCS verbal subscore is 5. GCS motor subscore is 6.         Fluids  No intake or output data in the 24 hours ending 12/30/20 1433    Laboratory  Recent Labs     12/29/20  0426 12/30/20  0408   WBC 9.4 12.1*   RBC 4.98 5.26   HEMOGLOBIN 14.6 15.6   HEMATOCRIT 45.5 47.9*   MCV 91.4 91.1   MCH 29.3 29.7   MCHC 32.1* 32.6*   RDW 47.7 46.7   PLATELETCT 284 362   MPV 11.1 11.1     Recent Labs     12/28/20  0249 12/29/20  0426 12/30/20  0408   SODIUM 138 144 144   POTASSIUM 2.9* 3.2* 3.6   CHLORIDE 101 106 104   CO2 25 25 24   GLUCOSE 108* 90 132*   BUN 16 27* 32*   CREATININE 0.62 0.57 0.82   CALCIUM 9.5 10.3* 10.2                   Imaging  DX-HIP-UNILATERAL-WITH PELVIS-1 VIEW LEFT   Final Result      Deformity of the  left proximal femur most likely representing a mildly impacted left subcapital fracture.      EC-ECHOCARDIOGRAM COMPLETE W/O CONT   Final Result      DX-CHEST-PORTABLE (1 VIEW)   Final Result      Peripheral interstitial opacities could be due to chronic inflammatory change, pulmonary edema or Covid 19 pneumonia.      Left basilar atelectasis or scar.      DX-ELBOW-LIMITED 2- LEFT   Final Result      No gross evidence of fracture or dislocation.      CT-HEAD W/O   Final Result      1.  No evidence of acute intracranial process.      2.  Cerebral atrophy as well as periventricular chronic small vessel ischemic change.           Assessment/Plan  Pneumonia due to COVID-19 virus  Assessment & Plan  Hypoxia is improving currently on 1 L  Continue Decadron  Pulse oximetry, incentive spirometry, wean down oxygen as tolerated      PSVT (paroxysmal supraventricular tachycardia) ([facility])  Assessment & Plan  Stop Felodipine and replace with Metoprolol    Closed subcapital fracture of femur, left, initial encounter ([facility])  Assessment & Plan  Reported fall PTA, elbow XR negative, hip XR shows mildly impacted left subcapital fracture  Discussed with Dr Padilla with recommendations for weight bearing as tolerated and follow up with TOM in two weeks for repeat imaging to ensure stability       Elevated brain natriuretic peptide (BNP) level  Assessment & Plan  Pt not clinically overloaded, echo with preserved EF, mild to moderate TR and slightly elevated R heart pressures.    Elevated AST (SGOT)  Assessment & Plan  Likely secondary to COVID.    No escalation in case desired by son, will hold off on RUQ u/s    Hypokalemia   Assessment & Plan  Start scheduled replacement, 40meq BID for now, may decrease dosing when K+ levels not significantly depressed       Dementia ([facility])  Assessment & Plan  CT head without contrast showed no acute abnormalities  Limit sedatives - does take Ultram and Lorazepam as an outpatient, will continue here to  limit withdrawals  Fall precautions    Elevated troponin  Assessment & Plan  Denies chest pain to me today, troponins are trending down.  D dimer is significantly elevated, but family desires no escalation in care, will defer CTA.  Echo from admission without WMA.   Continue baby ASA        VTE prophylaxis: Heparin

## 2020-12-30 NOTE — PROGRESS NOTES
Report received from THEA Cueto. Dx, medications, O2 needs and poc reviewed. Pt has no acute needs at this time. Care fully assumed.

## 2020-12-30 NOTE — DIETARY
"Nutrition Services: Update   Day 5 of admit.  Gerri Duggan is a 89 y.o. female with admitting DX of Pneumonia due to COVID-19 virus.     Diet downgraded by SLP on 12/28. Pt is currently on liquidised, mildly thick diet w/ 1:1 supervision. No intake of meals/supplements documented per flowsheets yesterday or today other than refused/0% x1 snack.     Per RN note yesterday pt \"Constantly moaning, mumbling and yelling. Encouraging to eat but refusing. Had a few sips of water tonight.\"    May consider appetite stimulant if appropriate with POC, though per review of current clinical picture, lack of PO appears mostly related to agitation and unclear if this would have a significant impact on pt's overall PO intake.     Tube feeding would not be recommended in pt w/ dementia. Additionally noted to be constantly pulling lines and does not follow commands.     [facility].      Malnutrition Risk: unable to determine    Recommendations/Plan:  1. Continue Boost Plus TID.    2. Encourage intake of meals and supplements  3. Document intake of all meals and supplements as % taken in ADL's to provide interdisciplinary communication across all shifts.   4. Monitor weight.    RD following    "

## 2020-12-30 NOTE — PROGRESS NOTES
Pt oriented to none. Constantly moaning, mumbling and yelling. Encouraging to eat but refusing. Had a few sips of water tonight. Does not follow commands. Moving all extremities and constantly pulling gown and blanket. Constantly removing oxygen cannula. High risk fall. Bed alarm active.

## 2020-12-30 NOTE — DISCHARGE PLANNING
Anticipated Discharge Disposition: [facility]    Action: MADELEINE called Kamala at [facility] [phone]. She requested current MAR to review medications. [facility] unable to accommodate any IV medications. Is able to take patient back on O2 if needed.   MADELEINE reviewed notes, looks like we are trending potassium prior to medical clearance.     Barriers to Discharge: labs, medical clearance, acceptence back at [facility].     Plan: Faxed over MAR to [phone]

## 2020-12-31 NOTE — THERAPY
Speech Language Pathology  Daily Treatment     Patient Name: Gerri Duggan  Age:  89 y.o., Sex:  female  Medical Record #: [id number]  Today's Date: 12/31/2020     Precautions: Fall Risk, Swallow Precautions (See Comments)    Assessment    Pt was seen today for f/u dysphagia tx session. Per RN, Pt has been restless all morning (including removing O2) and now has b/l mittens. Pt was repositioned fully upright for today's session and was awake but kept eyes closed throughout session and was nonverbal. Pt was provided with straw sip of Boost; however, Pt immediately demonstrated tonic bite reflex and was unable to follow directives to release bite despite max cues. Eventually, Pt opened mouth and straw was removed. Pt was then provided with trials via spoon and cup; however, demonstrated same response. A small amount of juice was provided into oral cavity but Pt made no effort to close lips and/or swallow, and the entire bolus spilled out of her mouth onto her chest. Pt appears at high risk for aspiration, malnutrition and dehydration with PO diet given the aforementioned findings.    Pt does not appear to be at the level to safely consume a PO diet and recommend NPO status at this time. If Pt/family elect to waive risk for aspiration, the safest diet to reduce but not eliminate risk for aspiration would be current diet of Liquidised solids, Mildly thick liquids and meds crushed in puree with 1:1 feeding. Discussed concerns with RN and MD; family will be contacted to provide further guidance re: POC. Will defer diet changes to MD at this time. Thank you.    Plan    Continue current treatment plan.    Discharge Recommendations: Recommend post-acute placement for additional speech therapy services prior to discharge home     Objective     12/31/20 1145   Cognitive-Linguistic   Level of Consciousness Confused   Dysphagia    Positioning / Behavior Modification Self Monitoring;Other (see Comments)  (Release bite  reflex)   Other Treatments PO trials of MTL   Diet / Liquid Recommendation NPO;Pre-Feeding Trials with SLP Only   Nutritional Liquid Intake Rating Scale Nothing by mouth   Nutritional Food Intake Rating Scale Nothing by mouth   Nursing Communication Swallow Precaution Sign Posted at Head of Bed   Short Term Goals   Short Term Goal # 1 Pt will consume PO diet of LQ3/MT2 with no clinical s/sx of aspiration/penetration.   Goal Outcome # 1 Progressing slower than expected

## 2020-12-31 NOTE — DISCHARGE PLANNING
MADELEINE called Kamala from [facility]: informed her that per rounds today, MD reported patient did not pass swallow test, not doing that well. He will be calling family to discuss goals of care.

## 2020-12-31 NOTE — DISCHARGE PLANNING
Anticipated Discharge Disposition: [facility]    Action: SW called Kamala, she reviewed notes and reported she is okay to return to [facility]. SW to follow up in rounds about potassium and any other needs, such as O2.     Barriers to Discharge: labs, O2, medical clearance.     Plan: [facility] is agreeable to take patient when she is ready.

## 2020-12-31 NOTE — PROGRESS NOTES
Hospital Medicine Daily Progress Note    Date of Service  12/31/2020    Chief Complaint  89 y.o. female admitted 12/25/2020 with GLF and hypoxia     Hospital Course  89 y.o. female with history of dementia, hypothyroidism, GERD, hypertensive heart disease, who presented 12/25/2020 after she was brought from [facility] after ground-level fall, generalized weakness and worsening of confusion.  Therefore history is limited.  Apparently she was complaining of left elbow pain, but x-ray did not show any dislocation or fracture.  Patient noted to be hypoxic with saturation 80 to 86% on room air.  Patient denied chest pain, cough or shortness of breath.  Evaluation in ER revealed that patient is COVID-19 positive.  Chest x-ray showed bilateral pneumonitis.  CT head without contrast showed cerebral atrophy.  Blood work abnormalities include troponin 121, potassium 3.1.  EKG showed sinus rhythm, heart rate 81, ST depression in lateral leads and T wave flattening.  No EKG to compare.    Interval Problem Update  12/26 - Pt remains on 4L O2, continue Decadron - troponins are trending down, no chest pain, awaiting echo results.  D dimer is significantly elevated, PTE could account for elevated troponins.  Renal function is fine, will check CTA today. Procalcitonin is normal, no fever, no focal consolidation on CXR, will stop antibiotics. Attempted to contact her son, Dirk, the phone number listed is disconnected. Will see if her facility has advanced directives on file for her.    12/27 - Advanced directives received from christie Mckeon, but apparently her POLST has not been signed by her PCP so [facility] does not have a completed copy. Will touch base with Palliative tomorrow to enquire about need for POLST as an inpatient. Discussed pt's care with son Mike who states that she would want to continue basic treatment, but no significant escalation in care. She does have a bruise to her L hip and fell PTA, will XR in  order to further guide pain management.  Will not pursue CTA or stress testing etc for her lab abnormalities.  Pt has no complaints today other than hip and back pain. Down to 2L O2.  May be able to discharge in the coming 1-2 days if we do not plan on escalating care if she declines.       12/28 - Hip fracture confirmed on XR, discussed with Dr Padilla who recommends weight bearing as tolerated and repeat imaging as an outpatient to confirm stability.  Nursing reports that pt's pain seems controlled with Ultram. Had an episode of PSVT overnight, again, son does not want to escalate care, will stop Felodipine and replace with Metoprolol. Will replace potassium as well - start scheduled 40mEq BID orally. Unclear what her baseline ambulatory status is - if she ambulates at her memory care unit, will consult PT. If she is bed and wheelchair bound, will not.  Discussed with nursing who will contact [facility] to clarify. Boost ordered for poor nutritional status. As son does not wish for escalation in care, pt could potentially discharge back to her memory care unit -  is in contact with them to see what their COVID policy is.  12/29: No overnight events.  Patient is currently on 1 L O2.  T-max 98.7, /66.  Potassium is 3.2 and continued on twice daily potassium chloride.  Awaiting placement  12/30: Overnight patient had an episode of agitation.  Haldol has been discontinued since there appeared to be a prolonged QTC, repeat EKG has been ordered to reevaluate the QTC.  White count is 12.1, potassium 3.6.  T-max 98, /59.  Patient is on 3 L O2.  12/31: Patient's condition continues to worsen.  There is been difficulty getting the patient to eat or drink fluids.  O2 requirements are 6 L.  Attempted to call the patient's son however no answer will reattempt later to discuss the goals of care.  Potassium was 5 and does not chloride was discontinued.  T-max 98.4, BP  98/60.      Consultants/Specialty  None    Code Status  DNAR/DNI    Disposition  Awaiting placement    Review of Systems  Review of Systems   Unable to perform ROS: Dementia        Physical Exam  Temp:  [36.4 °C (97.5 °F)-36.9 °C (98.4 °F)] 36.4 °C (97.5 °F)  Pulse:  [78-90] 78  Resp:  [16-18] 16  BP: ()/(54-82) 130/82  SpO2:  [88 %-100 %] 100 %    Physical Exam  Vitals signs and nursing note reviewed.   Constitutional:       General: She is not in acute distress.     Appearance: She is ill-appearing and toxic-appearing. She is not diaphoretic.   HENT:      Head: Normocephalic and atraumatic.      Mouth/Throat:      Pharynx: Oropharynx is clear.   Eyes:      General: No scleral icterus.  Cardiovascular:      Rate and Rhythm: Normal rate and regular rhythm.      Heart sounds: Normal heart sounds.   Pulmonary:      Effort: No respiratory distress.      Breath sounds: No wheezing or rhonchi.      Comments: Diminished throughout  Abdominal:      General: Abdomen is flat. Bowel sounds are normal. There is no distension.      Palpations: Abdomen is soft.      Tenderness: There is no abdominal tenderness. There is no guarding.   Musculoskeletal:         General: No swelling or tenderness.      Comments: Bruise to L hip   Skin:     General: Skin is warm and dry.      Coloration: Skin is not jaundiced.   Neurological:      General: No focal deficit present.      Mental Status: She is lethargic and disoriented.         Fluids  No intake or output data in the 24 hours ending 12/31/20 1214    Laboratory  Recent Labs     12/29/20  0426 12/30/20  0408 12/31/20  0540   WBC 9.4 12.1* 12.4*   RBC 4.98 5.26 5.56*   HEMOGLOBIN 14.6 15.6 16.3*   HEMATOCRIT 45.5 47.9* 52.0*   MCV 91.4 91.1 93.5   MCH 29.3 29.7 29.3   MCHC 32.1* 32.6* 31.3*   RDW 47.7 46.7 50.0   PLATELETCT 284 362 284   MPV 11.1 11.1 11.7     Recent Labs     12/29/20  0426 12/30/20  0408 12/31/20  0533   SODIUM 144 144 148*   POTASSIUM 3.2* 3.6 5.0   CHLORIDE 106  104 111   CO2 25 24 22   GLUCOSE 90 132* 135*   BUN 27* 32* 63*   CREATININE 0.57 0.82 1.17   CALCIUM 10.3* 10.2 10.3*                   Imaging  DX-HIP-UNILATERAL-WITH PELVIS-1 VIEW LEFT   Final Result      Deformity of the left proximal femur most likely representing a mildly impacted left subcapital fracture.      EC-ECHOCARDIOGRAM COMPLETE W/O CONT   Final Result      DX-CHEST-PORTABLE (1 VIEW)   Final Result      Peripheral interstitial opacities could be due to chronic inflammatory change, pulmonary edema or Covid 19 pneumonia.      Left basilar atelectasis or scar.      DX-ELBOW-LIMITED 2- LEFT   Final Result      No gross evidence of fracture or dislocation.      CT-HEAD W/O   Final Result      1.  No evidence of acute intracranial process.      2.  Cerebral atrophy as well as periventricular chronic small vessel ischemic change.           Assessment/Plan  Pneumonia due to COVID-19 virus  Assessment & Plan  Patient continues to be hypoxic currently on 6 L.  Continue Decadron  Pulse oximetry, incentive spirometry, wean down oxygen as tolerated  Patient's overall condition appears to be worsening attempting to call family to determine goals of care.  Patient may benefit from palliative care or hospice      PSVT (paroxysmal supraventricular tachycardia) ([facility])  Assessment & Plan  Stop Felodipine and replace with Metoprolol    Closed subcapital fracture of femur, left, initial encounter ([facility])  Assessment & Plan  Reported fall PTA, elbow XR negative, hip XR shows mildly impacted left subcapital fracture  Discussed with Dr Padilla with recommendations for weight bearing as tolerated and follow up with [facility]OM in two weeks for repeat imaging to ensure stability       Elevated brain natriuretic peptide (BNP) level  Assessment & Plan  Pt not clinically overloaded, echo with preserved EF, mild to moderate TR and slightly elevated R heart pressures.    Elevated AST (SGOT)  Assessment & Plan  Likely secondary to COVID.     No escalation in case desired by son, will hold off on RUQ u/s    Dementia (HCC)  Assessment & Plan  CT head without contrast showed no acute abnormalities  Limit sedatives - does take Ultram and Lorazepam as an outpatient, will continue here to limit withdrawals  Fall precautions    Elevated troponin  Assessment & Plan  Denies chest pain to me today, troponins are trending down.  D dimer is significantly elevated, but family desires no escalation in care, will defer CTA.  Echo from admission without WMA.   Continue baby ASA        VTE prophylaxis: Heparin

## 2020-12-31 NOTE — PROGRESS NOTES
Bedside report received from Letty SIDHU and assumed Pt's cares. Call light within reach. Safety measures in place.

## 2021-01-01 VITALS
RESPIRATION RATE: 22 BRPM | SYSTOLIC BLOOD PRESSURE: 94 MMHG | HEIGHT: 65 IN | DIASTOLIC BLOOD PRESSURE: 54 MMHG | HEART RATE: 95 BPM | OXYGEN SATURATION: 91 % | TEMPERATURE: 98.4 F | WEIGHT: 127.87 LBS | BODY MASS INDEX: 21.3 KG/M2

## 2021-01-01 LAB
ANION GAP SERPL CALC-SCNC: 18 MMOL/L (ref 7–16)
BUN SERPL-MCNC: 101 MG/DL (ref 8–22)
CALCIUM SERPL-MCNC: 10.7 MG/DL (ref 8.4–10.2)
CHLORIDE SERPL-SCNC: 119 MMOL/L (ref 96–112)
CO2 SERPL-SCNC: 17 MMOL/L (ref 20–33)
CREAT SERPL-MCNC: 1.75 MG/DL (ref 0.5–1.4)
GLUCOSE SERPL-MCNC: 116 MG/DL (ref 65–99)
POTASSIUM SERPL-SCNC: 6 MMOL/L (ref 3.6–5.5)
SODIUM SERPL-SCNC: 154 MMOL/L (ref 135–145)

## 2021-01-01 PROCEDURE — 700102 HCHG RX REV CODE 250 W/ 637 OVERRIDE(OP): Performed by: FAMILY MEDICINE

## 2021-01-01 PROCEDURE — 94640 AIRWAY INHALATION TREATMENT: CPT

## 2021-01-01 PROCEDURE — 700111 HCHG RX REV CODE 636 W/ 250 OVERRIDE (IP): Performed by: STUDENT IN AN ORGANIZED HEALTH CARE EDUCATION/TRAINING PROGRAM

## 2021-01-01 PROCEDURE — 700102 HCHG RX REV CODE 250 W/ 637 OVERRIDE(OP): Performed by: INTERNAL MEDICINE

## 2021-01-01 PROCEDURE — A9270 NON-COVERED ITEM OR SERVICE: HCPCS | Performed by: INTERNAL MEDICINE

## 2021-01-01 PROCEDURE — 80048 BASIC METABOLIC PNL TOTAL CA: CPT

## 2021-01-01 PROCEDURE — 700111 HCHG RX REV CODE 636 W/ 250 OVERRIDE (IP): Performed by: INTERNAL MEDICINE

## 2021-01-01 PROCEDURE — 99232 SBSQ HOSP IP/OBS MODERATE 35: CPT | Performed by: STUDENT IN AN ORGANIZED HEALTH CARE EDUCATION/TRAINING PROGRAM

## 2021-01-01 PROCEDURE — A9270 NON-COVERED ITEM OR SERVICE: HCPCS | Performed by: FAMILY MEDICINE

## 2021-01-01 PROCEDURE — 94760 N-INVAS EAR/PLS OXIMETRY 1: CPT

## 2021-01-01 RX ORDER — ONDANSETRON 4 MG/1
8 TABLET, ORALLY DISINTEGRATING ORAL EVERY 8 HOURS PRN
Status: DISCONTINUED | OUTPATIENT
Start: 2021-01-01 | End: 2021-01-01 | Stop reason: HOSPADM

## 2021-01-01 RX ORDER — MORPHINE SULFATE 10 MG/ML
10 INJECTION, SOLUTION INTRAMUSCULAR; INTRAVENOUS
Status: DISCONTINUED | OUTPATIENT
Start: 2021-01-01 | End: 2021-01-01 | Stop reason: HOSPADM

## 2021-01-01 RX ORDER — LORAZEPAM 2 MG/ML
1 CONCENTRATE ORAL
Status: DISCONTINUED | OUTPATIENT
Start: 2021-01-01 | End: 2021-01-01 | Stop reason: HOSPADM

## 2021-01-01 RX ORDER — ONDANSETRON 2 MG/ML
8 INJECTION INTRAMUSCULAR; INTRAVENOUS EVERY 8 HOURS PRN
Status: DISCONTINUED | OUTPATIENT
Start: 2021-01-01 | End: 2021-01-01 | Stop reason: HOSPADM

## 2021-01-01 RX ORDER — LORAZEPAM 2 MG/ML
1 INJECTION INTRAMUSCULAR
Status: DISCONTINUED | OUTPATIENT
Start: 2021-01-01 | End: 2021-01-01 | Stop reason: HOSPADM

## 2021-01-01 RX ORDER — ATROPINE SULFATE 10 MG/ML
2 SOLUTION/ DROPS OPHTHALMIC EVERY 4 HOURS PRN
Status: DISCONTINUED | OUTPATIENT
Start: 2021-01-01 | End: 2021-01-01

## 2021-01-01 RX ORDER — MORPHINE SULFATE 10 MG/ML
5 INJECTION, SOLUTION INTRAMUSCULAR; INTRAVENOUS
Status: DISCONTINUED | OUTPATIENT
Start: 2021-01-01 | End: 2021-01-01 | Stop reason: HOSPADM

## 2021-01-01 RX ADMIN — MORPHINE SULFATE 10 MG: 10 INJECTION INTRAVENOUS at 13:33

## 2021-01-01 RX ADMIN — LORAZEPAM 1 MG: 2 INJECTION INTRAMUSCULAR; INTRAVENOUS at 12:56

## 2021-01-01 RX ADMIN — MORPHINE SULFATE 5 MG: 10 INJECTION INTRAVENOUS at 10:46

## 2021-01-01 RX ADMIN — HEPARIN SODIUM 5000 UNITS: 5000 INJECTION, SOLUTION INTRAVENOUS; SUBCUTANEOUS at 06:06

## 2021-01-01 RX ADMIN — LORAZEPAM 1 MG: 2 INJECTION INTRAMUSCULAR; INTRAVENOUS at 10:45

## 2021-01-01 RX ADMIN — MORPHINE SULFATE 10 MG: 10 INJECTION INTRAVENOUS at 11:47

## 2021-01-01 RX ADMIN — LEVOTHYROXINE SODIUM 112 MCG: 0.11 TABLET ORAL at 06:07

## 2021-01-01 NOTE — PROGRESS NOTES
Bedside report received from day shift  RN and assumed Pt's cares. Call light within reach. Safety measures in place.

## 2021-01-01 NOTE — PROGRESS NOTES
[facility] Medicine Daily Progress Note    Date of Service  1/1/2021    Chief Complaint  89 y.o. female admitted 12/25/2020 with GLF and hypoxia     Hospital Course  89 y.o. female with history of dementia, hypothyroidism, GERD, hypertensive heart disease, who presented 12/25/2020 after she was brought from [facility] after ground-level fall, generalized weakness and worsening of confusion.  Therefore history is limited.  Apparently she was complaining of left elbow pain, but x-ray did not show any dislocation or fracture.  Patient noted to be hypoxic with saturation 80 to 86% on room air.  Patient denied chest pain, cough or shortness of breath.  Evaluation in ER revealed that patient is COVID-19 positive.  Chest x-ray showed bilateral pneumonitis.  CT head without contrast showed cerebral atrophy.  Blood work abnormalities include troponin 121, potassium 3.1.  EKG showed sinus rhythm, heart rate 81, ST depression in lateral leads and T wave flattening.  No EKG to compare.    Interval Problem Update  12/26 - Pt remains on 4L O2, continue Decadron - troponins are trending down, no chest pain, awaiting echo results.  D dimer is significantly elevated, PTE could account for elevated troponins.  Renal function is fine, will check CTA today. Procalcitonin is normal, no fever, no focal consolidation on CXR, will stop antibiotics. Attempted to contact her son, Dirk, the phone number listed is disconnected. Will see if her facility has advanced directives on file for her.    12/27 - Advanced directives received from christie Mckeon, but apparently her POLST has not been signed by her PCP so [facility] does not have a completed copy. Will touch base with Palliative tomorrow to enquire about need for POLST as an inpatient. Discussed pt's care with son Mike who states that she would want to continue basic treatment, but no significant escalation in care. She does have a bruise to her L hip and fell PTA, will XR in  order to further guide pain management.  Will not pursue CTA or stress testing etc for her lab abnormalities.  Pt has no complaints today other than hip and back pain. Down to 2L O2.  May be able to discharge in the coming 1-2 days if we do not plan on escalating care if she declines.       12/28 - Hip fracture confirmed on XR, discussed with Dr Padilla who recommends weight bearing as tolerated and repeat imaging as an outpatient to confirm stability.  Nursing reports that pt's pain seems controlled with Ultram. Had an episode of PSVT overnight, again, son does not want to escalate care, will stop Felodipine and replace with Metoprolol. Will replace potassium as well - start scheduled 40mEq BID orally. Unclear what her baseline ambulatory status is - if she ambulates at her memory care unit, will consult PT. If she is bed and wheelchair bound, will not.  Discussed with nursing who will contact [facility] to clarify. Boost ordered for poor nutritional status. As son does not wish for escalation in care, pt could potentially discharge back to her memory care unit -  is in contact with them to see what their COVID policy is.  12/29: No overnight events.  Patient is currently on 1 L O2.  T-max 98.7, /66.  Potassium is 3.2 and continued on twice daily potassium chloride.  Awaiting placement  12/30: Overnight patient had an episode of agitation.  Haldol has been discontinued since there appeared to be a prolonged QTC, repeat EKG has been ordered to reevaluate the QTC.  White count is 12.1, potassium 3.6.  T-max 98, /59.  Patient is on 3 L O2.  12/31: Patient's condition continues to worsen.  There is been difficulty getting the patient to eat or drink fluids.  O2 requirements are 6 L.  Attempted to call the patient's son however no answer will reattempt later to discuss the goals of care.  Potassium was 5 and does not chloride was discontinued.  T-max 98.4, BP 98/60.  1/1/2021: Patient's condition  continues to deteriorate.  Respiratory status is worsening she is now on high flow nasal cannula.  I rediscussed with the son the goals of care proximately 5 minutes and they do want to continue with comfort care.  Orders have been placed for comfort care.       Consultants/Specialty  None    Code Status  Comfort Care/DNR    Disposition  Comfort care    Review of Systems  Review of Systems   Unable to perform ROS: Dementia        Physical Exam  Temp:  [36.4 °C (97.6 °F)-36.9 °C (98.4 °F)] 36.9 °C (98.4 °F)  Pulse:  [] 98  Resp:  [18-24] 20  BP: ()/(49-68) 94/54  SpO2:  [83 %-98 %] 90 %    Physical Exam  Vitals signs and nursing note reviewed.   Constitutional:       Appearance: She is ill-appearing and toxic-appearing. She is not diaphoretic.   HENT:      Head: Normocephalic and atraumatic.      Mouth/Throat:      Pharynx: Oropharynx is clear.   Eyes:      General: No scleral icterus.  Cardiovascular:      Rate and Rhythm: Normal rate and regular rhythm.      Heart sounds: Normal heart sounds.   Pulmonary:      Effort: Accessory muscle usage present. No respiratory distress.      Breath sounds: Rales present. No wheezing or rhonchi.      Comments: On HFNC  Abdominal:      General: Abdomen is flat. Bowel sounds are normal. There is no distension.      Palpations: Abdomen is soft.      Tenderness: There is no abdominal tenderness. There is no guarding.   Musculoskeletal:         General: No swelling or tenderness.      Comments: Bruise to L hip   Skin:     General: Skin is warm and dry.      Coloration: Skin is not jaundiced.   Neurological:      Mental Status: She is lethargic.         Fluids    Intake/Output Summary (Last 24 hours) at 1/1/2021 1031  Last data filed at 1/1/2021 0730  Gross per 24 hour   Intake --   Output 1900 ml   Net -1900 ml       Laboratory  Recent Labs     12/30/20  0408 12/31/20  0540   WBC 12.1* 12.4*   RBC 5.26 5.56*   HEMOGLOBIN 15.6 16.3*   HEMATOCRIT 47.9* 52.0*   MCV 91.1 93.5    MCH 29.7 29.3   MCHC 32.6* 31.3*   RDW 46.7 50.0   PLATELETCT 362 284   MPV 11.1 11.7     Recent Labs     12/30/20  0408 12/31/20  0533 01/01/21  0522   SODIUM 144 148* 154*   POTASSIUM 3.6 5.0 6.0*   CHLORIDE 104 111 119*   CO2 24 22 17*   GLUCOSE 132* 135* 116*   BUN 32* 63* 101*   CREATININE 0.82 1.17 1.75*   CALCIUM 10.2 10.3* 10.7*                   Imaging  DX-HIP-UNILATERAL-WITH PELVIS-1 VIEW LEFT   Final Result      Deformity of the left proximal femur most likely representing a mildly impacted left subcapital fracture.      EC-ECHOCARDIOGRAM COMPLETE W/O CONT   Final Result      DX-CHEST-PORTABLE (1 VIEW)   Final Result      Peripheral interstitial opacities could be due to chronic inflammatory change, pulmonary edema or Covid 19 pneumonia.      Left basilar atelectasis or scar.      DX-ELBOW-LIMITED 2- LEFT   Final Result      No gross evidence of fracture or dislocation.      CT-HEAD W/O   Final Result      1.  No evidence of acute intracranial process.      2.  Cerebral atrophy as well as periventricular chronic small vessel ischemic change.           Assessment/Plan  Pneumonia due to COVID-19 virus  Assessment & Plan  Patient continues to be hypoxic currently on 6 L.  Continue Decadron  Pulse oximetry, incentive spirometry, wean down oxygen as tolerated  Patient's overall condition appears to be worsening attempting to call family to determine goals of care.  Patient may benefit from palliative care or hospice      PSVT (paroxysmal supraventricular tachycardia) ([facility])  Assessment & Plan  Stop Felodipine and replace with Metoprolol    Closed subcapital fracture of femur, left, initial encounter ([facility])  Assessment & Plan  Reported fall PTA, elbow XR negative, hip XR shows mildly impacted left subcapital fracture  Discussed with Dr Padilla with recommendations for weight bearing as tolerated and follow up with TOM in two weeks for repeat imaging to ensure stability       Elevated brain natriuretic  peptide (BNP) level  Assessment & Plan  Pt not clinically overloaded, echo with preserved EF, mild to moderate TR and slightly elevated R heart pressures.    Elevated AST (SGOT)  Assessment & Plan  Likely secondary to COVID.    No escalation in case desired by son, will hold off on RUQ u/s    Dementia (HCC)  Assessment & Plan  CT head without contrast showed no acute abnormalities  Limit sedatives - does take Ultram and Lorazepam as an outpatient, will continue here to limit withdrawals  Fall precautions    Elevated troponin  Assessment & Plan  Denies chest pain to me today, troponins are trending down.  D dimer is significantly elevated, but family desires no escalation in care, will defer CTA.  Echo from admission without WMA.   Continue baby ASA        VTE prophylaxis: Heparin

## 2021-01-01 NOTE — PROGRESS NOTES
Abdomen noted to be distended and tender, no urinary output recordered in almost 24 hrs eventhough she has a pure wick in place. Bladder scan performed and showed >999 ml. Dr Sands notified and ordered received to place a Weaver cath. Weaver cath placed using aseptic technic, Pt tolerated well. 1700 ml drained immediately. Dr Sands notified.

## 2021-01-01 NOTE — PROGRESS NOTES
Gave report to Nurse for Room 118 (Farhana) at [phone] regarding transfer and transition to comfort care, just awaiting doctors orders

## 2021-01-01 NOTE — PROGRESS NOTES
Pt did not take her PO medications this morning.  Pt won't stay awake, doesn't folow commands, and did not swallow her meds offered crushed and with apple sauce.

## 2021-01-01 NOTE — PROGRESS NOTES
Notified son, Mike, that mother was being moved to room 118, and gave new nurse extension and name to arrange a zoom call or visit for end of life if allowed

## 2021-01-01 NOTE — PROGRESS NOTES
"Patient  at 1430. Pronounced by this RN and Lionel Perez RN.     MD Cherry aware      made away. Not a  case.     Tissue bank made aware. Not a candidate for Organ Donation    [facility]  [facility] aware and will be here in an hour    Son Mike Duggan aware and will come  patient's glasses and hearing aids \"Sometime later this weekend\".   "

## 2021-01-01 NOTE — PROGRESS NOTES
"Report received from THEA Handley. Patient comfort care. Patient lethargic, unable to assess orientation, unable to follow directions. Mike Son updated about condition. Son does not want to visit at this time and wants \"Nature to take it's course.\"   "

## 2021-01-02 NOTE — DISCHARGE SUMMARY
Death Summary    Cause of Death  *Respiratory arrest due to acute respiratory failure with hypoxia due to pneumonia due to COVID-19.    Comorbid Conditions at the Time of Death  Active Problems:    Pneumonia due to COVID-19 virus POA: Unknown    Elevated troponin POA: Unknown    Dementia (Formerly [facility]) POA: Unknown    Hypokalemia  POA: Unknown    Elevated AST (SGOT) POA: Unknown    Elevated brain natriuretic peptide (BNP) level POA: Unknown    Closed subcapital fracture of femur, left, initial encounter (Formerly [facility]) POA: Unknown    PSVT (paroxysmal supraventricular tachycardia) (Formerly [facility]) POA: Unknown  Resolved Problems:    * No resolved hospital problems. *      History of Presenting Illness and Hospital Course  89 y.o. female with history of dementia, hypothyroidism, GERD, hypertensive heart disease, who presented 12/25/2020 after she was brought from [facility] after ground-level fall, generalized weakness and worsening of confusion.  Therefore history is limited.  Apparently she was complaining of left elbow pain, but x-ray did not show any dislocation or fracture.  Patient noted to be hypoxic with saturation 80 to 86% on room air.  Patient denied chest pain, cough or shortness of breath.  Evaluation in ER revealed that patient is COVID-19 positive.  Chest x-ray showed bilateral pneumonitis.  CT head without contrast showed cerebral atrophy.  Blood work abnormalities include troponin 121, potassium 3.1.  EKG showed sinus rhythm, heart rate 81, ST depression in lateral leads and T wave flattening.  No EKG to compare.    12/26 - Pt remains on 4L O2, continue Decadron - troponins are trending down, no chest pain, awaiting echo results.  D dimer is significantly elevated, PTE could account for elevated troponins.  Renal function is fine, will check CTA today. Procalcitonin is normal, no fever, no focal consolidation on CXR, will stop antibiotics. Attempted to contact her son, Dirk, the phone number listed is  disconnected. Will see if her facility has advanced directives on file for her.     12/27 - Advanced directives received from son Mike, but apparently her POLST has not been signed by her PCP so [facility] does not have a completed copy. Will touch base with Palliative tomorrow to enquire about need for POLST as an inpatient. Discussed pt's care with son Mike who states that she would want to continue basic treatment, but no significant escalation in care. She does have a bruise to her L hip and fell PTA, will XR in order to further guide pain management.  Will not pursue CTA or stress testing etc for her lab abnormalities.  Pt has no complaints today other than hip and back pain. Down to 2L O2.  May be able to discharge in the coming 1-2 days if we do not plan on escalating care if she declines.        12/28 - Hip fracture confirmed on XR, discussed with Dr Padilla who recommends weight bearing as tolerated and repeat imaging as an outpatient to confirm stability.  Nursing reports that pt's pain seems controlled with Ultram. Had an episode of PSVT overnight, again, son does not want to escalate care, will stop Felodipine and replace with Metoprolol. Will replace potassium as well - start scheduled 40mEq BID orally. Unclear what her baseline ambulatory status is - if she ambulates at her memory care unit, will consult PT. If she is bed and wheelchair bound, will not.  Discussed with nursing who will contact [facility] to clarify. Boost ordered for poor nutritional status. As son does not wish for escalation in care, pt could potentially discharge back to her memory care unit - CM is in contact with them to see what their COVID policy is.  12/29: No overnight events.  Patient is currently on 1 L O2.  T-max 98.7, /66.  Potassium is 3.2 and continued on twice daily potassium chloride.  Awaiting placement  12/30: Overnight patient had an episode of agitation.  Haldol has been discontinued since there appeared  to be a prolonged QTC, repeat EKG has been ordered to reevaluate the QTC.  White count is 12.1, potassium 3.6.  T-max 98, /59.  Patient is on 3 L O2.  12/31: Patient's condition continues to worsen.  There is been difficulty getting the patient to eat or drink fluids.  O2 requirements are 6 L.  Attempted to call the patient's son however no answer will reattempt later to discuss the goals of care.  Potassium was 5 and does not chloride was discontinued.  T-max 98.4, BP 98/60.  1/1/2021: Patient's condition continues to deteriorate.  Respiratory status is worsening she is now on high flow nasal cannula.  I rediscussed with the son the goals of care proximately 5 minutes and they do want to continue with comfort care.  Orders have been placed for comfort care.  Patient subsequently passed away at 1430 on January 1, 2021.    Death Date: 01/01/21   Death Time: 1430         Pronounced By (RN1): Nereida Velasco  Pronounced By (RN2): Lionel Perez      Total time spent: 34 minutes.
